# Patient Record
Sex: FEMALE | Race: WHITE | NOT HISPANIC OR LATINO | URBAN - METROPOLITAN AREA
[De-identification: names, ages, dates, MRNs, and addresses within clinical notes are randomized per-mention and may not be internally consistent; named-entity substitution may affect disease eponyms.]

---

## 2017-01-16 ENCOUNTER — EMERGENCY (EMERGENCY)
Facility: HOSPITAL | Age: 72
LOS: 1 days | Discharge: HOME | End: 2017-01-16
Attending: EMERGENCY MEDICINE | Admitting: EMERGENCY MEDICINE
Payer: MEDICARE

## 2017-01-16 VITALS
TEMPERATURE: 98 F | OXYGEN SATURATION: 100 % | DIASTOLIC BLOOD PRESSURE: 81 MMHG | RESPIRATION RATE: 17 BRPM | SYSTOLIC BLOOD PRESSURE: 104 MMHG | HEART RATE: 122 BPM

## 2017-01-16 VITALS — HEART RATE: 98 BPM

## 2017-01-16 DIAGNOSIS — R10.84 GENERALIZED ABDOMINAL PAIN: ICD-10-CM

## 2017-01-16 DIAGNOSIS — K51.00 ULCERATIVE (CHRONIC) PANCOLITIS WITHOUT COMPLICATIONS: ICD-10-CM

## 2017-01-16 DIAGNOSIS — Z90.710 ACQUIRED ABSENCE OF BOTH CERVIX AND UTERUS: Chronic | ICD-10-CM

## 2017-01-16 LAB
ALBUMIN SERPL ELPH-MCNC: 2.9 G/DL — LOW (ref 3.4–5)
ALP SERPL-CCNC: 77 U/L — SIGNIFICANT CHANGE UP (ref 40–120)
ALT FLD-CCNC: 20 U/L — SIGNIFICANT CHANGE UP (ref 12–42)
ANION GAP SERPL CALC-SCNC: 16 MMOL/L — SIGNIFICANT CHANGE UP (ref 9–16)
AST SERPL-CCNC: 15 U/L — SIGNIFICANT CHANGE UP (ref 15–37)
BILIRUB SERPL-MCNC: 0.7 MG/DL — SIGNIFICANT CHANGE UP (ref 0.2–1.2)
BUN SERPL-MCNC: 17 MG/DL — SIGNIFICANT CHANGE UP (ref 7–23)
CALCIUM SERPL-MCNC: 9.3 MG/DL — SIGNIFICANT CHANGE UP (ref 8.5–10.5)
CHLORIDE SERPL-SCNC: 103 MMOL/L — SIGNIFICANT CHANGE UP (ref 96–108)
CO2 SERPL-SCNC: 19 MMOL/L — LOW (ref 22–31)
CREAT SERPL-MCNC: 1.2 MG/DL — SIGNIFICANT CHANGE UP (ref 0.5–1.3)
EOSINOPHIL NFR BLD AUTO: 2 % — SIGNIFICANT CHANGE UP (ref 0–6)
GLUCOSE SERPL-MCNC: 163 MG/DL — HIGH (ref 70–99)
HCT VFR BLD CALC: 41.1 % — SIGNIFICANT CHANGE UP (ref 34.5–45)
HGB BLD-MCNC: 13.5 G/DL — SIGNIFICANT CHANGE UP (ref 11.5–15.5)
LACTATE SERPL-SCNC: 1.7 MMOL/L — SIGNIFICANT CHANGE UP (ref 0.4–2)
LACTATE SERPL-SCNC: 4.2 MMOL/L — CRITICAL HIGH (ref 0.4–2)
LIDOCAIN IGE QN: 79 U/L — SIGNIFICANT CHANGE UP (ref 73–393)
LYMPHOCYTES # BLD AUTO: 15 % — SIGNIFICANT CHANGE UP (ref 13–44)
MAGNESIUM SERPL-MCNC: 1.8 MG/DL — SIGNIFICANT CHANGE UP (ref 1.6–2.4)
MCHC RBC-ENTMCNC: 30.5 PG — SIGNIFICANT CHANGE UP (ref 27–34)
MCHC RBC-ENTMCNC: 32.8 G/DL — SIGNIFICANT CHANGE UP (ref 32–36)
MCV RBC AUTO: 93 FL — SIGNIFICANT CHANGE UP (ref 80–100)
MONOCYTES NFR BLD AUTO: 9 % — SIGNIFICANT CHANGE UP (ref 2–14)
NEUTROPHILS NFR BLD AUTO: 63 % — SIGNIFICANT CHANGE UP (ref 43–77)
PLATELET # BLD AUTO: 294 K/UL — SIGNIFICANT CHANGE UP (ref 150–400)
POTASSIUM SERPL-MCNC: 3 MMOL/L — LOW (ref 3.5–5.3)
POTASSIUM SERPL-SCNC: 3 MMOL/L — LOW (ref 3.5–5.3)
PROT SERPL-MCNC: 6.9 G/DL — SIGNIFICANT CHANGE UP (ref 6.4–8.2)
RBC # BLD: 4.42 M/UL — SIGNIFICANT CHANGE UP (ref 3.8–5.2)
RBC # FLD: 13.3 % — SIGNIFICANT CHANGE UP (ref 10.3–16.9)
SODIUM SERPL-SCNC: 138 MMOL/L — SIGNIFICANT CHANGE UP (ref 132–145)
WBC # BLD: 9 K/UL — SIGNIFICANT CHANGE UP (ref 3.8–10.5)
WBC # FLD AUTO: 9 K/UL — SIGNIFICANT CHANGE UP (ref 3.8–10.5)

## 2017-01-16 PROCEDURE — 74176 CT ABD & PELVIS W/O CONTRAST: CPT | Mod: 26

## 2017-01-16 PROCEDURE — 99284 EMERGENCY DEPT VISIT MOD MDM: CPT | Mod: 25

## 2017-01-16 PROCEDURE — 93010 ELECTROCARDIOGRAM REPORT: CPT

## 2017-01-16 RX ORDER — CIPROFLOXACIN LACTATE 400MG/40ML
500 VIAL (ML) INTRAVENOUS ONCE
Qty: 0 | Refills: 0 | Status: COMPLETED | OUTPATIENT
Start: 2017-01-16 | End: 2017-01-16

## 2017-01-16 RX ORDER — METRONIDAZOLE 500 MG
1 TABLET ORAL
Qty: 30 | Refills: 0 | OUTPATIENT
Start: 2017-01-16 | End: 2017-01-26

## 2017-01-16 RX ORDER — SODIUM CHLORIDE 9 MG/ML
1000 INJECTION INTRAMUSCULAR; INTRAVENOUS; SUBCUTANEOUS ONCE
Qty: 0 | Refills: 0 | Status: COMPLETED | OUTPATIENT
Start: 2017-01-16 | End: 2017-01-16

## 2017-01-16 RX ORDER — IOHEXOL 300 MG/ML
50 INJECTION, SOLUTION INTRAVENOUS ONCE
Qty: 0 | Refills: 0 | Status: COMPLETED | OUTPATIENT
Start: 2017-01-16 | End: 2017-01-16

## 2017-01-16 RX ORDER — POTASSIUM CHLORIDE 20 MEQ
40 PACKET (EA) ORAL ONCE
Qty: 0 | Refills: 0 | Status: COMPLETED | OUTPATIENT
Start: 2017-01-16 | End: 2017-01-16

## 2017-01-16 RX ORDER — MOXIFLOXACIN HYDROCHLORIDE TABLETS, 400 MG 400 MG/1
1 TABLET, FILM COATED ORAL
Qty: 20 | Refills: 0 | OUTPATIENT
Start: 2017-01-16 | End: 2017-01-26

## 2017-01-16 RX ORDER — METRONIDAZOLE 500 MG
500 TABLET ORAL ONCE
Qty: 0 | Refills: 0 | Status: COMPLETED | OUTPATIENT
Start: 2017-01-16 | End: 2017-01-16

## 2017-01-16 RX ORDER — ONDANSETRON 8 MG/1
4 TABLET, FILM COATED ORAL ONCE
Qty: 0 | Refills: 0 | Status: COMPLETED | OUTPATIENT
Start: 2017-01-16 | End: 2017-01-16

## 2017-01-16 RX ADMIN — Medication 500 MILLIGRAM(S): at 21:26

## 2017-01-16 RX ADMIN — SODIUM CHLORIDE 500 MILLILITER(S): 9 INJECTION INTRAMUSCULAR; INTRAVENOUS; SUBCUTANEOUS at 17:35

## 2017-01-16 RX ADMIN — SODIUM CHLORIDE 500 MILLILITER(S): 9 INJECTION INTRAMUSCULAR; INTRAVENOUS; SUBCUTANEOUS at 16:28

## 2017-01-16 RX ADMIN — Medication 40 MILLIEQUIVALENT(S): at 18:10

## 2017-01-16 RX ADMIN — IOHEXOL 50 MILLILITER(S): 300 INJECTION, SOLUTION INTRAVENOUS at 16:28

## 2017-01-16 RX ADMIN — ONDANSETRON 4 MILLIGRAM(S): 8 TABLET, FILM COATED ORAL at 16:28

## 2017-01-16 NOTE — ED PROVIDER NOTE - NS ED MD SCRIBE ATTENDING SCRIBE SECTIONS
PAST MEDICAL/SURGICAL/SOCIAL HISTORY/HISTORY OF PRESENT ILLNESS/HIV/INTAKE ASSESSMENT/SCREENINGS/VITAL SIGNS( Pullset)/REVIEW OF SYSTEMS/PHYSICAL EXAM

## 2017-01-16 NOTE — ED PROVIDER NOTE - MEDICAL DECISION MAKING DETAILS
mild abd cramping, nvd x 2 wk, will check labs, fluids, antiemetic, CT, reassess, PO challenge, low suspicion for ACS, initial ekg ordered for tachycardia

## 2017-01-16 NOTE — ED PROVIDER NOTE - PHYSICAL EXAMINATION
CON: ao x 3, HENMT: clear oropharynx, dry mucous membrane, soft neck, HEAD: atraumatic, CV: rrr, equal pulses b/l, RESP: cta b/l, GI: +BS, soft, nontender, no rebound, no guarding, SKIN: no rash, MSK: moving all extremities spontaneously, NEURO: nonfocal

## 2017-01-16 NOTE — ED PROVIDER NOTE - PROGRESS NOTE DETAILS
ct reviewed, pancolitis, pt reassessed, soft abd, nontender, offered admission, declines.  reports no sx besides diarrhea, feels much improved after fluids, wishes to go home and will follow up.

## 2017-01-16 NOTE — ED PROVIDER NOTE - OBJECTIVE STATEMENT
71y F Pt s/p hysterectomy presents to ED with worsening nausea and diarrhea x1 week. Associated loss of appetite, dehydration, and mild cramping abd pain. Denies fever, vomiting, CP, and SOB. No recent travel. No recent hospitalization. Hip replacement 4 years ago 71y F Pt s/p hysterectomy presents to ED with worsening nausea and diarrhea x1 week. Associated loss of appetite, dehydration, and mild cramping abd pain. Denies fever, vomiting, CP, and SOB. No recent travel. No recent hospitalization. Hip replacement 4 years ago.  no recent abx.

## 2017-01-19 ENCOUNTER — EMERGENCY (EMERGENCY)
Facility: HOSPITAL | Age: 72
LOS: 1 days | Discharge: HOME | End: 2017-01-19
Attending: EMERGENCY MEDICINE | Admitting: EMERGENCY MEDICINE
Payer: MEDICARE

## 2017-01-19 VITALS
RESPIRATION RATE: 18 BRPM | SYSTOLIC BLOOD PRESSURE: 141 MMHG | TEMPERATURE: 97 F | OXYGEN SATURATION: 98 % | DIASTOLIC BLOOD PRESSURE: 85 MMHG | HEART RATE: 95 BPM

## 2017-01-19 DIAGNOSIS — Z79.899 OTHER LONG TERM (CURRENT) DRUG THERAPY: ICD-10-CM

## 2017-01-19 DIAGNOSIS — Z79.2 LONG TERM (CURRENT) USE OF ANTIBIOTICS: ICD-10-CM

## 2017-01-19 DIAGNOSIS — R11.2 NAUSEA WITH VOMITING, UNSPECIFIED: ICD-10-CM

## 2017-01-19 DIAGNOSIS — E03.9 HYPOTHYROIDISM, UNSPECIFIED: ICD-10-CM

## 2017-01-19 DIAGNOSIS — R19.7 DIARRHEA, UNSPECIFIED: ICD-10-CM

## 2017-01-19 DIAGNOSIS — Z90.710 ACQUIRED ABSENCE OF BOTH CERVIX AND UTERUS: Chronic | ICD-10-CM

## 2017-01-19 DIAGNOSIS — I10 ESSENTIAL (PRIMARY) HYPERTENSION: ICD-10-CM

## 2017-01-19 DIAGNOSIS — Z98.890 OTHER SPECIFIED POSTPROCEDURAL STATES: ICD-10-CM

## 2017-01-19 LAB
ALBUMIN SERPL ELPH-MCNC: 3.1 G/DL — LOW (ref 3.4–5)
ALP SERPL-CCNC: 68 U/L — SIGNIFICANT CHANGE UP (ref 40–120)
ALT FLD-CCNC: 22 U/L — SIGNIFICANT CHANGE UP (ref 12–42)
ANION GAP SERPL CALC-SCNC: 11 MMOL/L — SIGNIFICANT CHANGE UP (ref 9–16)
APPEARANCE UR: CLEAR — SIGNIFICANT CHANGE UP
AST SERPL-CCNC: 19 U/L — SIGNIFICANT CHANGE UP (ref 15–37)
BASOPHILS NFR BLD AUTO: 0.6 % — SIGNIFICANT CHANGE UP (ref 0–2)
BILIRUB SERPL-MCNC: 0.6 MG/DL — SIGNIFICANT CHANGE UP (ref 0.2–1.2)
BILIRUB UR-MCNC: NEGATIVE — SIGNIFICANT CHANGE UP
BUN SERPL-MCNC: 9 MG/DL — SIGNIFICANT CHANGE UP (ref 7–23)
CALCIUM SERPL-MCNC: 9.1 MG/DL — SIGNIFICANT CHANGE UP (ref 8.5–10.5)
CHLORIDE SERPL-SCNC: 109 MMOL/L — HIGH (ref 96–108)
CO2 SERPL-SCNC: 24 MMOL/L — SIGNIFICANT CHANGE UP (ref 22–31)
COLOR SPEC: YELLOW — SIGNIFICANT CHANGE UP
CREAT SERPL-MCNC: 1.01 MG/DL — SIGNIFICANT CHANGE UP (ref 0.5–1.3)
DIFF PNL FLD: (no result)
EOSINOPHIL NFR BLD AUTO: 0.7 % — SIGNIFICANT CHANGE UP (ref 0–6)
GLUCOSE SERPL-MCNC: 154 MG/DL — HIGH (ref 70–99)
GLUCOSE UR QL: NEGATIVE — SIGNIFICANT CHANGE UP
HCT VFR BLD CALC: 38.6 % — SIGNIFICANT CHANGE UP (ref 34.5–45)
HGB BLD-MCNC: 13 G/DL — SIGNIFICANT CHANGE UP (ref 11.5–15.5)
IMM GRANULOCYTES NFR BLD AUTO: 4.1 % — HIGH (ref 0–1.5)
KETONES UR-MCNC: 15 MG/DL
LEUKOCYTE ESTERASE UR-ACNC: (no result)
LIDOCAIN IGE QN: 94 U/L — SIGNIFICANT CHANGE UP (ref 73–393)
LYMPHOCYTES # BLD AUTO: 14 % — SIGNIFICANT CHANGE UP (ref 13–44)
MCHC RBC-ENTMCNC: 31.6 PG — SIGNIFICANT CHANGE UP (ref 27–34)
MCHC RBC-ENTMCNC: 33.7 G/DL — SIGNIFICANT CHANGE UP (ref 32–36)
MCV RBC AUTO: 93.9 FL — SIGNIFICANT CHANGE UP (ref 80–100)
MONOCYTES NFR BLD AUTO: 6.7 % — SIGNIFICANT CHANGE UP (ref 2–14)
NEUTROPHILS NFR BLD AUTO: 73.9 % — SIGNIFICANT CHANGE UP (ref 43–77)
NITRITE UR-MCNC: NEGATIVE — SIGNIFICANT CHANGE UP
PH UR: 6 — SIGNIFICANT CHANGE UP (ref 4–8.1)
PLATELET # BLD AUTO: 409 K/UL — HIGH (ref 150–400)
POTASSIUM SERPL-MCNC: 3.3 MMOL/L — LOW (ref 3.5–5.3)
POTASSIUM SERPL-SCNC: 3.3 MMOL/L — LOW (ref 3.5–5.3)
PROT SERPL-MCNC: 6.7 G/DL — SIGNIFICANT CHANGE UP (ref 6.4–8.2)
PROT UR-MCNC: NEGATIVE MG/DL — SIGNIFICANT CHANGE UP
RBC # BLD: 4.11 M/UL — SIGNIFICANT CHANGE UP (ref 3.8–5.2)
RBC # FLD: 13.4 % — SIGNIFICANT CHANGE UP (ref 10.3–16.9)
SODIUM SERPL-SCNC: 144 MMOL/L — SIGNIFICANT CHANGE UP (ref 132–145)
SP GR SPEC: 1.01 — SIGNIFICANT CHANGE UP (ref 1–1.03)
UROBILINOGEN FLD QL: 0.2 E.U./DL — SIGNIFICANT CHANGE UP
WBC # BLD: 10.7 K/UL — HIGH (ref 3.8–10.5)
WBC # FLD AUTO: 10.7 K/UL — HIGH (ref 3.8–10.5)

## 2017-01-19 PROCEDURE — 99284 EMERGENCY DEPT VISIT MOD MDM: CPT

## 2017-01-19 RX ORDER — SODIUM CHLORIDE 9 MG/ML
1000 INJECTION INTRAMUSCULAR; INTRAVENOUS; SUBCUTANEOUS ONCE
Qty: 0 | Refills: 0 | Status: COMPLETED | OUTPATIENT
Start: 2017-01-19 | End: 2017-01-19

## 2017-01-19 RX ORDER — PANTOPRAZOLE SODIUM 20 MG/1
40 TABLET, DELAYED RELEASE ORAL ONCE
Qty: 0 | Refills: 0 | Status: COMPLETED | OUTPATIENT
Start: 2017-01-19 | End: 2017-01-19

## 2017-01-19 RX ORDER — ONDANSETRON 8 MG/1
1 TABLET, FILM COATED ORAL
Qty: 18 | Refills: 0 | OUTPATIENT
Start: 2017-01-19 | End: 2017-01-25

## 2017-01-19 RX ORDER — SODIUM CHLORIDE 9 MG/ML
3 INJECTION INTRAMUSCULAR; INTRAVENOUS; SUBCUTANEOUS ONCE
Qty: 0 | Refills: 0 | Status: COMPLETED | OUTPATIENT
Start: 2017-01-19 | End: 2017-01-19

## 2017-01-19 RX ORDER — POTASSIUM CHLORIDE 20 MEQ
40 PACKET (EA) ORAL ONCE
Qty: 0 | Refills: 0 | Status: COMPLETED | OUTPATIENT
Start: 2017-01-19 | End: 2017-01-19

## 2017-01-19 RX ORDER — ONDANSETRON 8 MG/1
4 TABLET, FILM COATED ORAL ONCE
Qty: 0 | Refills: 0 | Status: COMPLETED | OUTPATIENT
Start: 2017-01-19 | End: 2017-01-19

## 2017-01-19 RX ADMIN — ONDANSETRON 4 MILLIGRAM(S): 8 TABLET, FILM COATED ORAL at 14:47

## 2017-01-19 RX ADMIN — SODIUM CHLORIDE 1000 MILLILITER(S): 9 INJECTION INTRAMUSCULAR; INTRAVENOUS; SUBCUTANEOUS at 14:48

## 2017-01-19 RX ADMIN — Medication 40 MILLIEQUIVALENT(S): at 16:01

## 2017-01-19 RX ADMIN — SODIUM CHLORIDE 3 MILLILITER(S): 9 INJECTION INTRAMUSCULAR; INTRAVENOUS; SUBCUTANEOUS at 14:48

## 2017-01-19 RX ADMIN — PANTOPRAZOLE SODIUM 40 MILLIGRAM(S): 20 TABLET, DELAYED RELEASE ORAL at 14:47

## 2017-01-19 NOTE — ED PROVIDER NOTE - OBJECTIVE STATEMENT
72 y/o F patient with history of HTN, hypothyroid and acid reflux seen here two days ago and diagnosed with colitis. CT done at that time and patient discharged home. states medication is making her nauseous with vomiting and diarrhea since yesterday. no blood present. states she had a normal dinner yesterday. Denies any pain.

## 2017-01-19 NOTE — ED PROVIDER NOTE - ATTENDING CONTRIBUTION TO CARE
Patient seen and examined by me.  Agree with H&P as documented by PA.  Pt diagnosed with colitis 2 days ago by CT here.  On Cipro/Flagyl.  Improving abdominal cramps and diarrhea, but pt now c/o nausea/vomiting.  REceived IV hydration here.  Labs ok.  Tolerated PO after IV Zofran.  D/C home with Zofran ODT.  continue Cipro/Flagyl.

## 2017-01-19 NOTE — ED PROVIDER NOTE - NS ED MD SCRIBE ATTENDING SCRIBE SECTIONS
REVIEW OF SYSTEMS/DISPOSITION/PHYSICAL EXAM/HIV/HISTORY OF PRESENT ILLNESS/PAST MEDICAL/SURGICAL/SOCIAL HISTORY/INTAKE ASSESSMENT/SCREENINGS

## 2017-01-19 NOTE — ED PROVIDER NOTE - MEDICAL DECISION MAKING DETAILS
The scribe's documentation has been prepared under my direction and personally reviewed by me in its entirety. I confirm that the note above accurately reflects all work, treatment, procedures, and medical decision making performed by me. NITISH Rodriguez The scribe's documentation has been prepared under my direction and personally reviewed by me in its entirety. I confirm that the note above accurately reflects all work, treatment, procedures, and medical decision making performed by me. NITISH Rodriguez    Pt. with with recent dx of mild colitis, on cipro/flagyl pt. states causing her to feel nauseous. vss, abd. soft, tolerating PO, diarrhea improved, k 3.3, replaced,  will give rx for antiemetic

## 2017-01-19 NOTE — ED ADULT TRIAGE NOTE - CHIEF COMPLAINT QUOTE
pt was here on monday and given antibiotics for collitis. pt here c/o terrible nausea chills and vomitting since yesterday.

## 2017-01-20 LAB
CULTURE RESULTS: NO GROWTH — SIGNIFICANT CHANGE UP
SPECIMEN SOURCE: SIGNIFICANT CHANGE UP

## 2018-03-29 ENCOUNTER — INPATIENT (INPATIENT)
Facility: HOSPITAL | Age: 73
LOS: 0 days | Discharge: HOME | DRG: 66 | End: 2018-03-30
Attending: PSYCHIATRY & NEUROLOGY | Admitting: PSYCHIATRY & NEUROLOGY
Payer: COMMERCIAL

## 2018-03-29 ENCOUNTER — EMERGENCY (EMERGENCY)
Facility: HOSPITAL | Age: 73
LOS: 1 days | End: 2018-03-29
Attending: EMERGENCY MEDICINE | Admitting: EMERGENCY MEDICINE
Payer: MEDICARE

## 2018-03-29 VITALS
DIASTOLIC BLOOD PRESSURE: 79 MMHG | RESPIRATION RATE: 17 BRPM | TEMPERATURE: 97 F | OXYGEN SATURATION: 98 % | SYSTOLIC BLOOD PRESSURE: 165 MMHG | HEART RATE: 60 BPM

## 2018-03-29 VITALS
DIASTOLIC BLOOD PRESSURE: 80 MMHG | TEMPERATURE: 98 F | HEART RATE: 82 BPM | SYSTOLIC BLOOD PRESSURE: 149 MMHG | RESPIRATION RATE: 18 BRPM | OXYGEN SATURATION: 100 %

## 2018-03-29 VITALS — TEMPERATURE: 97 F | HEART RATE: 62 BPM | SYSTOLIC BLOOD PRESSURE: 158 MMHG | DIASTOLIC BLOOD PRESSURE: 92 MMHG

## 2018-03-29 DIAGNOSIS — I10 ESSENTIAL (PRIMARY) HYPERTENSION: ICD-10-CM

## 2018-03-29 DIAGNOSIS — E03.9 HYPOTHYROIDISM, UNSPECIFIED: ICD-10-CM

## 2018-03-29 DIAGNOSIS — Z90.710 ACQUIRED ABSENCE OF BOTH CERVIX AND UTERUS: Chronic | ICD-10-CM

## 2018-03-29 DIAGNOSIS — Z29.9 ENCOUNTER FOR PROPHYLACTIC MEASURES, UNSPECIFIED: ICD-10-CM

## 2018-03-29 DIAGNOSIS — I63.9 CEREBRAL INFARCTION, UNSPECIFIED: ICD-10-CM

## 2018-03-29 DIAGNOSIS — R63.8 OTHER SYMPTOMS AND SIGNS CONCERNING FOOD AND FLUID INTAKE: ICD-10-CM

## 2018-03-29 PROBLEM — C55 MALIGNANT NEOPLASM OF UTERUS, PART UNSPECIFIED: Chronic | Status: ACTIVE | Noted: 2017-01-19

## 2018-03-29 LAB
ALBUMIN SERPL ELPH-MCNC: 3.8 G/DL — SIGNIFICANT CHANGE UP (ref 3.4–5)
ALP SERPL-CCNC: 98 U/L — SIGNIFICANT CHANGE UP (ref 40–120)
ALT FLD-CCNC: 19 U/L — SIGNIFICANT CHANGE UP (ref 12–42)
ANION GAP SERPL CALC-SCNC: 12 MMOL/L — SIGNIFICANT CHANGE UP (ref 9–16)
APPEARANCE UR: CLEAR — SIGNIFICANT CHANGE UP
AST SERPL-CCNC: 20 U/L — SIGNIFICANT CHANGE UP (ref 15–37)
BILIRUB SERPL-MCNC: 0.6 MG/DL — SIGNIFICANT CHANGE UP (ref 0.2–1.2)
BILIRUB UR-MCNC: NEGATIVE — SIGNIFICANT CHANGE UP
BUN SERPL-MCNC: 17 MG/DL — SIGNIFICANT CHANGE UP (ref 7–23)
CALCIUM SERPL-MCNC: 9.5 MG/DL — SIGNIFICANT CHANGE UP (ref 8.5–10.5)
CHLORIDE SERPL-SCNC: 105 MMOL/L — SIGNIFICANT CHANGE UP (ref 96–108)
CK MB BLD-MCNC: <2.38 % — SIGNIFICANT CHANGE UP
CK MB CFR SERPL CALC: <0.5 NG/ML — LOW (ref 0.5–3.6)
CK SERPL-CCNC: 21 U/L — LOW (ref 26–192)
CO2 SERPL-SCNC: 25 MMOL/L — SIGNIFICANT CHANGE UP (ref 22–31)
COLOR SPEC: YELLOW — SIGNIFICANT CHANGE UP
CREAT SERPL-MCNC: 0.88 MG/DL — SIGNIFICANT CHANGE UP (ref 0.5–1.3)
DIFF PNL FLD: NEGATIVE — SIGNIFICANT CHANGE UP
GLUCOSE SERPL-MCNC: 122 MG/DL — HIGH (ref 70–99)
GLUCOSE UR QL: NEGATIVE — SIGNIFICANT CHANGE UP
HCT VFR BLD CALC: 43.8 % — SIGNIFICANT CHANGE UP (ref 34.5–45)
HGB BLD-MCNC: 14.4 G/DL — SIGNIFICANT CHANGE UP (ref 11.5–15.5)
KETONES UR-MCNC: NEGATIVE — SIGNIFICANT CHANGE UP
LEUKOCYTE ESTERASE UR-ACNC: (no result)
MAGNESIUM SERPL-MCNC: 2.1 MG/DL — SIGNIFICANT CHANGE UP (ref 1.6–2.6)
MCHC RBC-ENTMCNC: 31.5 PG — SIGNIFICANT CHANGE UP (ref 27–34)
MCHC RBC-ENTMCNC: 32.9 G/DL — SIGNIFICANT CHANGE UP (ref 32–36)
MCV RBC AUTO: 95.8 FL — SIGNIFICANT CHANGE UP (ref 80–100)
NITRITE UR-MCNC: NEGATIVE — SIGNIFICANT CHANGE UP
PH UR: 6.5 — SIGNIFICANT CHANGE UP (ref 5–8)
PLATELET # BLD AUTO: 279 K/UL — SIGNIFICANT CHANGE UP (ref 150–400)
POTASSIUM SERPL-MCNC: 3.5 MMOL/L — SIGNIFICANT CHANGE UP (ref 3.5–5.3)
POTASSIUM SERPL-SCNC: 3.5 MMOL/L — SIGNIFICANT CHANGE UP (ref 3.5–5.3)
PROT SERPL-MCNC: 7.5 G/DL — SIGNIFICANT CHANGE UP (ref 6.4–8.2)
PROT UR-MCNC: NEGATIVE MG/DL — SIGNIFICANT CHANGE UP
RBC # BLD: 4.57 M/UL — SIGNIFICANT CHANGE UP (ref 3.8–5.2)
RBC # FLD: 13.2 % — SIGNIFICANT CHANGE UP (ref 10.3–16.9)
SODIUM SERPL-SCNC: 142 MMOL/L — SIGNIFICANT CHANGE UP (ref 132–145)
SP GR SPEC: 1.01 — SIGNIFICANT CHANGE UP (ref 1–1.03)
TROPONIN I SERPL-MCNC: <0.017 NG/ML — LOW (ref 0.02–0.06)
UROBILINOGEN FLD QL: 0.2 E.U./DL — SIGNIFICANT CHANGE UP
WBC # BLD: 6.8 K/UL — SIGNIFICANT CHANGE UP (ref 3.8–10.5)
WBC # FLD AUTO: 6.8 K/UL — SIGNIFICANT CHANGE UP (ref 3.8–10.5)

## 2018-03-29 PROCEDURE — G0426: CPT

## 2018-03-29 PROCEDURE — 99291 CRITICAL CARE FIRST HOUR: CPT | Mod: 25

## 2018-03-29 PROCEDURE — 71045 X-RAY EXAM CHEST 1 VIEW: CPT | Mod: 26

## 2018-03-29 PROCEDURE — 93010 ELECTROCARDIOGRAM REPORT: CPT

## 2018-03-29 PROCEDURE — 70450 CT HEAD/BRAIN W/O DYE: CPT | Mod: 26

## 2018-03-29 PROCEDURE — 70551 MRI BRAIN STEM W/O DYE: CPT | Mod: 26

## 2018-03-29 RX ORDER — HEPARIN SODIUM 5000 [USP'U]/ML
5000 INJECTION INTRAVENOUS; SUBCUTANEOUS EVERY 8 HOURS
Qty: 0 | Refills: 0 | Status: DISCONTINUED | OUTPATIENT
Start: 2018-03-29 | End: 2018-04-02

## 2018-03-29 RX ORDER — LEVOTHYROXINE SODIUM 125 MCG
1 TABLET ORAL
Qty: 0 | Refills: 0 | COMMUNITY

## 2018-03-29 RX ORDER — ASPIRIN/CALCIUM CARB/MAGNESIUM 324 MG
325 TABLET ORAL ONCE
Qty: 0 | Refills: 0 | Status: COMPLETED | OUTPATIENT
Start: 2018-03-29 | End: 2018-03-29

## 2018-03-29 RX ORDER — HEPARIN SODIUM 5000 [USP'U]/ML
5000 INJECTION INTRAVENOUS; SUBCUTANEOUS EVERY 8 HOURS
Qty: 0 | Refills: 0 | Status: DISCONTINUED | OUTPATIENT
Start: 2018-03-29 | End: 2018-03-30

## 2018-03-29 RX ORDER — LEVOTHYROXINE SODIUM 125 MCG
50 TABLET ORAL DAILY
Qty: 0 | Refills: 0 | Status: DISCONTINUED | OUTPATIENT
Start: 2018-03-29 | End: 2018-03-30

## 2018-03-29 RX ORDER — LISINOPRIL 2.5 MG/1
1 TABLET ORAL
Qty: 0 | Refills: 0 | COMMUNITY

## 2018-03-29 RX ORDER — ASPIRIN/CALCIUM CARB/MAGNESIUM 324 MG
81 TABLET ORAL DAILY
Qty: 0 | Refills: 0 | Status: DISCONTINUED | OUTPATIENT
Start: 2018-03-30 | End: 2018-03-30

## 2018-03-29 RX ORDER — ATORVASTATIN CALCIUM 80 MG/1
80 TABLET, FILM COATED ORAL AT BEDTIME
Qty: 0 | Refills: 0 | Status: DISCONTINUED | OUTPATIENT
Start: 2018-03-29 | End: 2018-03-30

## 2018-03-29 RX ADMIN — Medication 325 MILLIGRAM(S): at 16:45

## 2018-03-29 RX ADMIN — Medication 0.5 MILLIGRAM(S): at 22:12

## 2018-03-29 RX ADMIN — HEPARIN SODIUM 5000 UNIT(S): 5000 INJECTION INTRAVENOUS; SUBCUTANEOUS at 21:25

## 2018-03-29 RX ADMIN — ATORVASTATIN CALCIUM 80 MILLIGRAM(S): 80 TABLET, FILM COATED ORAL at 21:25

## 2018-03-29 NOTE — H&P ADULT - ATTENDING COMMENTS
Patient seen and examined.  Agree with above.  Patient continues to have left arm numbness but less than yesterday.  No specific weakness in her arms or legs.  No headache or neck pain.  sbp 130-160's    O: General - sitting in bed, NAD  CV: RRR  Extremities: 2+ radial pulses bilaterally    Neurological exam:   Mental status - AA&Ox3, fluent, no dysarthria, follows all commands, able provide full history, memory intact  Cranial nerves - EOMI, PERRL+, VFF, no nystagmus, facial sensation mainly intact, no facial droop, palatal elevation intact, tongue midline  Motor - No pronator drift, 5/5 x4, normal bulk and tone  Sensory - Decreased light touch to 95% on left side of arm, intact in lower extremities  Cerebellar - FNF intact bilaterally  Gait - deferred    MR Head No Cont (03.29.18 @ 23:02) -     1. Findings consistent with an acute right thalamic infarct.  2. Chronic white matter microangiopathic ischemic disease.   3. Chronic lacunar infarct involving the right subcortical insular    Echocardiogram w/ Bubble and Doppler (03.30.18 @ 11:52) -     Normal left ventricular size and wall thickness. The left ventricular wall motion is normal. The left ventricular ejection fraction is estimated to   be 60-65%. The left atrial size is normal. No evidence for interatrial shunt by color Doppler assessment.  Injection of agitated saline contrast   documented no interatrial shunt.  Right atrial size is normal. The right ventricle is normal in size and function. No aortic regurgitation noted. No hemodynamically significant valvular aortic stenosis. There is trace mitral regurgitation. There is trace tricuspid regurgitation.    Lipid Profile (03.30.18 @ 06:14)    Total Cholesterol/HDL Ratio Measurement: 3.6 RATIO    Cholesterol, Serum: 194 mg/dL    Triglycerides, Serum: 91 mg/dL    HDL Cholesterol, Serum: 54 mg/dL    Direct LDL: 122  Hemoglobin A1C, Whole Blood (03.30.18 @ 06:14)    Hemoglobin A1C, Whole Blood: 4.8    Assessment: 72 year-old female presents with left sided numbness noted upon wakening yesterday.  Found to have right thalamic acute infarct that correlates well with her numbness.    - She was not a tPA candidate due to outside window and resolving symptoms.    Plan:  1) Secondary stroke prevention -   a) Continue Aspirin 81mg for antiplatelet  b) Continue Atorvastatin 80mg for statin.    2) Stroke risk factors - most important intervention given infarct's size and location -   a) HTN - slightly high in setting of acute infarct.  May need low level medication for control.  She should monitor as outpatient and discuss with her PMD.  Goal sbp - less than 140/80  b) Hyperlipidemia - LDL goal is less than 100    3) Stroke workup -   a) Hypercoagulable workup since young person with infarct and doesn't have major stroke risk factors.  b) Physical therapy evaluation    4) DVT prophylaxis - Heparin SQ TID    Patient neurologically cleared for discharge on above regimen once evaluated by physical therapy.    Discussed stroke signs and symptoms.  Addressed her questions.

## 2018-03-29 NOTE — ED ADULT NURSE NOTE - OBJECTIVE STATEMENT
Patient to ED with complaint of left sided numbness of face arm and hand.  Felt since this morning.  Patient demonstrating no slurred speech or neuro deficits.

## 2018-03-29 NOTE — ED ADULT TRIAGE NOTE - CHIEF COMPLAINT QUOTE
left sided numbness hands, legs and facial area,, started 2pm today. left sided numbness hands, legs and facial area,, started 2pm today. STROKE CODE CALLED.

## 2018-03-29 NOTE — H&P ADULT - NSHPSOCIALHISTORY_GEN_ALL_CORE
Former smoker but quit 50 years ago. Denies EtOH. Denies other drug use. Lives alone in elevator [employer].

## 2018-03-29 NOTE — H&P ADULT - NSHPLABSRESULTS_GEN_ALL_CORE
LABS                 14.4   6.8   )-----------( 279      ( 29 Mar 2018 15:31 )             43.8         142  |  105  |  17  ----------------------------<  122<H>  3.5   |  25  |  0.88    Ca    9.5      29 Mar 2018 15:31  Mg     2.1         TPro  7.5  /  Alb  3.8  /  TBili  0.6  /  DBili  x   /  AST  20  /  ALT  19  /  AlkPhos  98        Urinalysis Basic - ( 29 Mar 2018 16:35 )    Color: Yellow / Appearance: Clear / S.015 / pH: x  Gluc: x / Ketone: NEGATIVE  / Bili: NEGATIVE / Urobili: 0.2 E.U./dL   Blood: x / Protein: NEGATIVE mg/dL / Nitrite: NEGATIVE   Leuk Esterase: Trace / RBC: < 5 /HPF / WBC < 5 /HPF   Sq Epi: x / Non Sq Epi: Few /HPF / Bacteria: Present /HPF      CARDIAC MARKERS ( 29 Mar 2018 15:31 )  <0.017 ng/mL / x     / 21 U/L / x     / <0.5 ng/mL

## 2018-03-29 NOTE — H&P ADULT - PROBLEM SELECTOR PLAN 4
F: No IVF  E: Replete PRN  N: NPO pending dysphagia screen, suspect patient will pass, following DASH/TLC

## 2018-03-29 NOTE — ED PROVIDER NOTE - PROGRESS NOTE DETAILS
walterke with Dr. Resendiz. Pt with a mild contrast allergy. will forgo CTA imaging at this point and have pt evaluated by stroke neurologist.

## 2018-03-29 NOTE — ED PROVIDER NOTE - NEUROLOGICAL, MLM
Alert and oriented, no focal deficits, no motor deficits.  No pronator drift.  NO truncal ataxia.  normal FS, BRIGITTE.  Slight sensory deficit (80-90% of R side) when testing L face, arm, and leg

## 2018-03-29 NOTE — ED ADULT NURSE REASSESSMENT NOTE - NS ED NURSE REASSESS COMMENT FT1
patient walks with steady gait to the bathroom.   she reports that she woke up today at 2pm and had the symptoms of numbness of left cheek and left hand and foot which have not resolved

## 2018-03-29 NOTE — ED PROVIDER NOTE - OBJECTIVE STATEMENT
73 y/o F with hx of hypothyroidism (on meds) presents with L-sided numbness since she woke up at 2PM today. Pt states she fell asleep around 5-6AM today (pt's sleep schedule is reversed) and had slept intermittently, she had felt slightly numb but thought she was still dreaming. Pt states she was fine the day before. Pt states numbness is primarily in her L hand and slightly to her L arm, leg, and face. Pt states the numbness started in her hand and when she got up, she realized that her face and leg felt slightly numb too. Denies weakness, pt ambulated to the ED. Denies HA or dizziness. Pt states her mouth is slightly crooked at baseline.   Denies h/o DM or hyperlipidemia. Pt used to be on lisinopril for PMHx of HTN but states her BP is now WNL. Denies h/o stroke or TIA. Pt has had CT several times but mentions that she had a slight rash and sneezing the last time she had CT with contrast. Also has issues with Benadryl. 73 y/o F with hx of hypothyroidism (on meds) and hyperlipidemia presents with L-sided numbness since she woke up at 2PM today. Pt states she fell asleep around 5-6AM today (pt's sleep schedule is reversed) and had slept intermittently, she had felt slightly numb but thought she was still dreaming. Pt states she was fine the day before. Pt states numbness is primarily in her L hand and slightly to her L arm, leg, and face. Pt states the numbness started in her hand and when she got up, she realized that her face and leg felt slightly numb too. Denies h/o similar incidences. Denies weakness, pt ambulated to the ED. Denies HA, neck pain, dizziness, or chest pain. Pt states her mouth is slightly crooked at baseline.   Pt used to be on lisinopril for PMHx of HTN but her BP improved so her PMD took her off it. Pt states she has slightly elevated blood glucose levels. Denies h/o stroke or TIA.   Pt has had CT several times but mentions that she had a slight rash and sneezing the last time she had CT with contrast. Also has issues with Benadryl. 71 y/o F with hx of hypothyroidism (on meds) and hyperlipidemia, PSHx of R hip replacement, presents with L-sided numbness since she woke up at 2PM today. Pt states she fell asleep around 5-6AM today (pt's sleep schedule is reversed) and had slept intermittently, she had felt slightly numb but thought she was still dreaming. Pt states she was fine the day before. Pt states numbness is primarily in her L hand and slightly to her L arm, leg, and face. Pt states the numbness started in her hand and when she got up, she realized that her face and leg felt slightly numb too. Denies h/o similar incidences. Denies weakness, pt ambulated to the ED. Denies HA, neck pain, dizziness, or chest pain. Pt states her mouth is slightly crooked at baseline.   Pt used to be on lisinopril for PMHx of HTN but her BP improved so her PMD took her off it. Pt states she has slightly elevated blood glucose levels. Denies h/o stroke or TIA.   Pt has had CT several times but mentions that she had a slight rash and sneezing the last time she had CT with contrast. Also has issues with Benadryl. 71 y/o F with hx of hypothyroidism (on meds) and hyperlipidemia, PSHx of R hip replacement, presents with L-sided numbness since she woke up at 2PM today. Pt states she fell asleep around 5-6AM today (pt's sleep schedule is reversed) and had slept intermittently, she had felt slightly numb but thought she was still dreaming. Pt states she was fine the day before. Pt states numbness is primarily in her L hand and slightly to her L arm, leg, and face. Pt states the numbness started in her hand and when she got up, she realized that her face and leg felt slightly numb too. Denies h/o similar incidences. Denies weakness, pt ambulated to the ED. Denies HA, neck pain, dizziness, or chest pain. Pt states her mouth is slightly crooked at baseline.   Pt used to be on lisinopril for PMHx of HTN but her BP improved so her PMD took her off it. Pt states she has slightly elevated blood glucose levels. Denies h/o stroke or TIA. Denies EtOH use. Denies tobacco use currently (former smoker, pt quit in her 20s).  Pt has had CT several times but mentions that she had a slight rash and sneezing the last time she had CT with contrast. Also has issues with Benadryl. 73 y/o F with hx of hypothyroidism (on meds) and hyperlipidemia, PSHx of R hip replacement, presents with L-sided numbness since she woke up at 2PM today. Pt states she fell asleep around 5-6AM today (pt's sleep schedule is reversed) and had slept intermittently, she had felt slightly numb but thought she was still dreaming. Pt states she was fine the day before. Pt states numbness is primarily in her L hand and slightly to her L arm, leg, and face. Pt states the numbness started in her hand and when she got up, she realized that her face and leg felt slightly numb too. Denies h/o similar incidences. Denies weakness, pt ambulated to the ED. Denies HA, neck pain, dizziness, or chest pain. Pt states her mouth is slightly crooked at baseline.   Denies alcohol or drug use  Pt used to be on lisinopril for PMHx of HTN but her BP improved so her PMD took her off it. Pt states she has slightly elevated blood glucose levels. Denies h/o stroke or TIA. Denies EtOH use. Denies tobacco use currently (former smoker, pt quit in her 20s).  Pt has had CT several times but mentions that she had a slight rash and sneezing the last time she had CT with contrast. Also has issues with Benadryl.

## 2018-03-29 NOTE — ED PROVIDER NOTE - MEDICAL DECISION MAKING DETAILS
Stroke code called and patient evaluated on arrival and brought to CT scan.  Last known normal ~ 5-6 am which puts the patient outside the window for TPA.  Also unable to get CTA at this time due to iodine allergy and reaction to IV contrast during her last scan.  BP elevated on arrival.  Slight sensory deficit on exam.  Neuro exam performed with Dr. Resendiz (please see her stroke note for full details).  EKG with LVH pattern.  Labs unremarkable.  CXR reviewed.  D/w her PMD Dr. Esposito.  Will admit to [facility] for further evaluation.  Symptoms persist and ABCD2 score 4.

## 2018-03-29 NOTE — PATIENT PROFILE ADULT. - NS PRO CONTRA FLU CONTRAIND
pt refused/Allergy sensitivity to eggs, thimerosal (if included in the vaccine), or any other component of the vaccine (i.e., aminoglycosides)

## 2018-03-29 NOTE — H&P ADULT - NSHPREVIEWOFSYSTEMS_GEN_ALL_CORE
REVIEW OF SYSTEMS:  CONSTITUTIONAL: No weakness, fevers or chills  EYES/ENT: No visual changes;  No vertigo or throat pain   NECK: No pain or stiffness  RESPIRATORY: No cough, wheezing, hemoptysis; No shortness of breath  CARDIOVASCULAR: No chest pain or palpitations  GASTROINTESTINAL: No abdominal or epigastric pain. No nausea, vomiting, or hematemesis; No diarrhea or constipation. No melena or hematochezia.  GENITOURINARY: No dysuria, frequency or hematuria  NEUROLOGICAL: +numbness or No weakness  SKIN: No itching, burning, rashes, or lesions   All other review of systems is negative unless indicated above.

## 2018-03-29 NOTE — H&P ADULT - HISTORY OF PRESENT ILLNESS
72F with hypothyroid and hypertension (diet controlled) who presented with left sided numbness of 1 day duration. Patient states when she woke up at 14:00 on day of admission she felt the left hand, face, leg, and foot were numb (not left arm). Nothing she did could make it better. Nothing made it worse. It stayed the same intensity throughout the day. At first she thought it was because she "slept funny" but when it didn't improve she went to [facility].    Patient denies weakness, slurred speech, tingling, blurry vision, changes in hearing, seizures, nausea, vertigo, changes in urinary or bowel habits.     At [facility] T98, /80, HR82, O2Sat 100% on RA. She got progressively more hypertensive while at [facility]. Stroke code was called. NIHSS 2. Given ASA 325mg. CTH  showed R capsular and basal ganglia lacunar infarcts. Prior to transport to [facility] patient states numbness improved.

## 2018-03-29 NOTE — H&P ADULT - NSHPPHYSICALEXAM_GEN_ALL_CORE
VITALS  Vital Signs Last 24 Hrs  T(C): 36.2 (29 Mar 2018 19:10), Max: 36.7 (29 Mar 2018 15:14)  T(F): 97.1 (29 Mar 2018 19:10), Max: 98 (29 Mar 2018 15:14)  HR: 60 (29 Mar 2018 20:40) (59 - 82)  BP: 165/79 (29 Mar 2018 20:40) (149/80 - 170/79)  BP(mean): 114 (29 Mar 2018 20:40) (114 - 114)  RR: 17 (29 Mar 2018 20:40) (16 - 18)  SpO2: 98% (29 Mar 2018 20:40) (98% - 100%)    CAPILLARY BLOOD GLUCOSE  POCT Blood Glucose.: 109 mg/dL (29 Mar 2018 15:18)    PHYSICAL EXAM  General: NAD  Head: NC/AT; PERRL; EOMI; anicteric sclera  Respiratory: CTA B/L; no wheezes/crackles/rales auscultated w/ good air movement  Cardiovascular: Regular rhythm/rate; S1/S2; no gallops or murmurs auscultated  Gastrointestinal: Soft; NTND w/out rebound tenderness or guarding; bowel sounds normal  Extremities: WWP; no edema or cyanosis; radial/pedal pulses palpable  Neurologic:  - Mental Status:  AAOx3; speech is fluent with intact naming, repetition, and comprehension  - Cranial Nerves II-XII:    II:  PERRLA; visual fields are full to confrontation  III, IV, VI:  EOMI, no nystagmus  V:  facial sensation is intact in the V1-V3 distribution bilaterally.  VII:  face is symmetric with normal eye closure and smile  VIII:  hearing is intact to finger rub  IX, X:  uvula is midline and soft palate rises symmetrically  XI:  head turning and shoulder shrug are intact bilaterally  XII:  tongue protrudes in the midline  - Motor:  strength is 5/5 throughout; normal muscle bulk and tone throughout; no pronator drift  - Reflexes:  2+ and symmetric at the biceps, triceps, brachioradialis, knees, and ankles;  plantar reflexes downgoing bilaterally  - Sensory:  intact to pin prick, vibration, and joint-position sense throughout. Decreased sensation to light touch on left compared to right. Patient states much improved from before, lighter on left slightly than right.   - Coordination:  finger-nose-finger and heel-knee-shin intact without dysmetria; rapid alternating hand movements intact  - Gait:  normal steps, base, arm swing, and turning; heel and toe walking are normal; tandem gait is normal; Romberg testing is negative VITALS  Vital Signs Last 24 Hrs  T(C): 36.2 (29 Mar 2018 19:10), Max: 36.7 (29 Mar 2018 15:14)  T(F): 97.1 (29 Mar 2018 19:10), Max: 98 (29 Mar 2018 15:14)  HR: 60 (29 Mar 2018 20:40) (59 - 82)  BP: 165/79 (29 Mar 2018 20:40) (149/80 - 170/79)  BP(mean): 114 (29 Mar 2018 20:40) (114 - 114)  RR: 17 (29 Mar 2018 20:40) (16 - 18)  SpO2: 98% (29 Mar 2018 20:40) (98% - 100%)    CAPILLARY BLOOD GLUCOSE  POCT Blood Glucose.: 109 mg/dL (29 Mar 2018 15:18)    PHYSICAL EXAM  General: NAD  Head: NC/AT; PERRL; EOMI; anicteric sclera  Respiratory: CTA B/L; no wheezes/crackles/rales auscultated w/ good air movement  Cardiovascular: Regular rhythm/rate; S1/S2; no gallops or murmurs auscultated  Gastrointestinal: Soft; NTND w/out rebound tenderness or guarding; bowel sounds normal  Extremities: WWP; no edema or cyanosis; radial/pedal pulses palpable  Neurologic:  - Mental Status:  AAOx3; speech is fluent with intact naming, repetition, and comprehension  - Cranial Nerves II-XII:    II:  PERRLA; visual fields are full to confrontation  III, IV, VI:  EOMI, no nystagmus  V:  L-sided numbness VI-III on   VII:  +mild R nasolabial flattening  VIII:  hearing is intact to finger rub  IX, X:  uvula is midline and soft palate rises symmetrically  XI:  head turning and shoulder shrug are intact bilaterally  XII:  tongue protrudes in the midline  - Motor:  strength is 5/5 throughout; normal muscle bulk and tone throughout; no pronator drift  - Reflexes:  2+ and symmetric at the biceps, triceps, brachioradialis, knees, and ankles;  plantar reflexes downgoing bilaterally  - Sensory:  intact to pin prick, vibration, and joint-position sense throughout. Decreased sensation to light touch on left compared to right. Patient states much improved from before, lighter on left slightly than right.   - Coordination:  finger-nose-finger and heel-knee-shin intact without dysmetria; rapid alternating hand movements intact  - Gait:  normal steps, base, arm swing, and turning; heel and toe walking are normal; tandem gait is normal; Romberg testing is negative

## 2018-03-29 NOTE — H&P ADULT - ASSESSMENT
72F with HTN and hypothyroid who presented with several hours of left sided numbness, found to have CVA on CTH. Improvement in numbness since presentation of symptoms. Being admitted for optimization and work up for stroke to telemetry unit.

## 2018-03-29 NOTE — H&P ADULT - PROBLEM SELECTOR PLAN 1
-MRI Head; patient has claustrophobia will give 0.5mg Ativan   -Start ASA 81mg Qd to start tomorrow  -Start Atorvastatin 80mg QHS  -PT/OT  -Dysphagia/Aspiration Precautions  -F/U Lipid, HgbA1c, TSH -MRI Head; patient has claustrophobia will give 0.5mg Ativan   -Start ASA 81mg Qd to start tomorrow  -Start Atorvastatin 80mg QHS  -ECHO with bubble in AM  -PT/OT  -Dysphagia/Aspiration Precautions  -F/U Lipid, HgbA1c, TSH

## 2018-03-29 NOTE — ED PROVIDER NOTE - PHYSICAL EXAMINATION
clear and coherent speech, normal cranial nerve exam, normal finger to nose and BRIGITTE.  Steady gait.  No pronator drift.

## 2018-03-30 ENCOUNTER — TRANSCRIPTION ENCOUNTER (OUTPATIENT)
Age: 73
End: 2018-03-30

## 2018-03-30 VITALS — TEMPERATURE: 98 F

## 2018-03-30 PROBLEM — Z00.00 ENCOUNTER FOR PREVENTIVE HEALTH EXAMINATION: Status: ACTIVE | Noted: 2018-03-30

## 2018-03-30 LAB
ANION GAP SERPL CALC-SCNC: 11 MMOL/L — SIGNIFICANT CHANGE UP (ref 5–17)
BLD GP AB SCN SERPL QL: NEGATIVE — SIGNIFICANT CHANGE UP
BUN SERPL-MCNC: 13 MG/DL — SIGNIFICANT CHANGE UP (ref 7–23)
CALCIUM SERPL-MCNC: 8.6 MG/DL — SIGNIFICANT CHANGE UP (ref 8.4–10.5)
CHLORIDE SERPL-SCNC: 107 MMOL/L — SIGNIFICANT CHANGE UP (ref 96–108)
CHOLEST SERPL-MCNC: 194 MG/DL — SIGNIFICANT CHANGE UP (ref 10–199)
CO2 SERPL-SCNC: 25 MMOL/L — SIGNIFICANT CHANGE UP (ref 22–31)
CREAT SERPL-MCNC: 0.64 MG/DL — SIGNIFICANT CHANGE UP (ref 0.5–1.3)
GLUCOSE SERPL-MCNC: 100 MG/DL — HIGH (ref 70–99)
HBA1C BLD-MCNC: 4.8 % — SIGNIFICANT CHANGE UP (ref 4–5.6)
HCT VFR BLD CALC: 39 % — SIGNIFICANT CHANGE UP (ref 34.5–45)
HDLC SERPL-MCNC: 54 MG/DL — SIGNIFICANT CHANGE UP (ref 40–125)
HGB BLD-MCNC: 12.6 G/DL — SIGNIFICANT CHANGE UP (ref 11.5–15.5)
LIPID PNL WITH DIRECT LDL SERPL: 122 MG/DL — SIGNIFICANT CHANGE UP
MAGNESIUM SERPL-MCNC: 2.1 MG/DL — SIGNIFICANT CHANGE UP (ref 1.6–2.6)
MCHC RBC-ENTMCNC: 30.8 PG — SIGNIFICANT CHANGE UP (ref 27–34)
MCHC RBC-ENTMCNC: 32.3 G/DL — SIGNIFICANT CHANGE UP (ref 32–36)
MCV RBC AUTO: 95.4 FL — SIGNIFICANT CHANGE UP (ref 80–100)
PLATELET # BLD AUTO: 238 K/UL — SIGNIFICANT CHANGE UP (ref 150–400)
POTASSIUM SERPL-MCNC: 4 MMOL/L — SIGNIFICANT CHANGE UP (ref 3.5–5.3)
POTASSIUM SERPL-SCNC: 4 MMOL/L — SIGNIFICANT CHANGE UP (ref 3.5–5.3)
RBC # BLD: 4.09 M/UL — SIGNIFICANT CHANGE UP (ref 3.8–5.2)
RBC # FLD: 13.6 % — SIGNIFICANT CHANGE UP (ref 10.3–16.9)
RH IG SCN BLD-IMP: POSITIVE — SIGNIFICANT CHANGE UP
SODIUM SERPL-SCNC: 143 MMOL/L — SIGNIFICANT CHANGE UP (ref 135–145)
TOTAL CHOLESTEROL/HDL RATIO MEASUREMENT: 3.6 RATIO — SIGNIFICANT CHANGE UP (ref 3.3–7.1)
TRIGL SERPL-MCNC: 91 MG/DL — SIGNIFICANT CHANGE UP (ref 10–149)
TSH SERPL-MCNC: 1.62 UIU/ML — SIGNIFICANT CHANGE UP (ref 0.35–4.94)
WBC # BLD: 6.2 K/UL — SIGNIFICANT CHANGE UP (ref 3.8–10.5)
WBC # FLD AUTO: 6.2 K/UL — SIGNIFICANT CHANGE UP (ref 3.8–10.5)

## 2018-03-30 PROCEDURE — 70551 MRI BRAIN STEM W/O DYE: CPT

## 2018-03-30 PROCEDURE — 86146 BETA-2 GLYCOPROTEIN ANTIBODY: CPT

## 2018-03-30 PROCEDURE — 70450 CT HEAD/BRAIN W/O DYE: CPT

## 2018-03-30 PROCEDURE — 93005 ELECTROCARDIOGRAM TRACING: CPT

## 2018-03-30 PROCEDURE — 86850 RBC ANTIBODY SCREEN: CPT

## 2018-03-30 PROCEDURE — 71045 X-RAY EXAM CHEST 1 VIEW: CPT

## 2018-03-30 PROCEDURE — 36415 COLL VENOUS BLD VENIPUNCTURE: CPT

## 2018-03-30 PROCEDURE — 83735 ASSAY OF MAGNESIUM: CPT

## 2018-03-30 PROCEDURE — 80061 LIPID PANEL: CPT

## 2018-03-30 PROCEDURE — 85613 RUSSELL VIPER VENOM DILUTED: CPT

## 2018-03-30 PROCEDURE — 85027 COMPLETE CBC AUTOMATED: CPT

## 2018-03-30 PROCEDURE — 99239 HOSP IP/OBS DSCHRG MGMT >30: CPT

## 2018-03-30 PROCEDURE — 80048 BASIC METABOLIC PNL TOTAL CA: CPT

## 2018-03-30 PROCEDURE — 85598 HEXAGNAL PHOSPH PLTLT NEUTRL: CPT

## 2018-03-30 PROCEDURE — 83090 ASSAY OF HOMOCYSTEINE: CPT

## 2018-03-30 PROCEDURE — 84443 ASSAY THYROID STIM HORMONE: CPT

## 2018-03-30 PROCEDURE — 85303 CLOT INHIBIT PROT C ACTIVITY: CPT

## 2018-03-30 PROCEDURE — 86900 BLOOD TYPING SEROLOGIC ABO: CPT

## 2018-03-30 PROCEDURE — 85307 ASSAY ACTIVATED PROTEIN C: CPT

## 2018-03-30 PROCEDURE — 93306 TTE W/DOPPLER COMPLETE: CPT

## 2018-03-30 PROCEDURE — 85301 ANTITHROMBIN III ANTIGEN: CPT

## 2018-03-30 PROCEDURE — 85300 ANTITHROMBIN III ACTIVITY: CPT

## 2018-03-30 PROCEDURE — 97161 PT EVAL LOW COMPLEX 20 MIN: CPT

## 2018-03-30 PROCEDURE — 85306 CLOT INHIBIT PROT S FREE: CPT

## 2018-03-30 PROCEDURE — 86901 BLOOD TYPING SEROLOGIC RH(D): CPT

## 2018-03-30 PROCEDURE — 93306 TTE W/DOPPLER COMPLETE: CPT | Mod: 26

## 2018-03-30 PROCEDURE — 83036 HEMOGLOBIN GLYCOSYLATED A1C: CPT

## 2018-03-30 RX ORDER — SODIUM CHLORIDE 9 MG/ML
500 INJECTION INTRAMUSCULAR; INTRAVENOUS; SUBCUTANEOUS ONCE
Qty: 0 | Refills: 0 | Status: COMPLETED | OUTPATIENT
Start: 2018-03-30 | End: 2018-03-30

## 2018-03-30 RX ORDER — SODIUM CHLORIDE 9 MG/ML
1000 INJECTION INTRAMUSCULAR; INTRAVENOUS; SUBCUTANEOUS
Qty: 0 | Refills: 0 | Status: DISCONTINUED | OUTPATIENT
Start: 2018-03-30 | End: 2018-03-30

## 2018-03-30 RX ORDER — ASPIRIN/CALCIUM CARB/MAGNESIUM 324 MG
1 TABLET ORAL
Qty: 30 | Refills: 2 | OUTPATIENT
Start: 2018-03-30 | End: 2018-06-27

## 2018-03-30 RX ORDER — ATORVASTATIN CALCIUM 80 MG/1
1 TABLET, FILM COATED ORAL
Qty: 30 | Refills: 2 | OUTPATIENT
Start: 2018-03-30 | End: 2018-06-27

## 2018-03-30 RX ADMIN — SODIUM CHLORIDE 80 MILLILITER(S): 9 INJECTION INTRAMUSCULAR; INTRAVENOUS; SUBCUTANEOUS at 06:28

## 2018-03-30 RX ADMIN — HEPARIN SODIUM 5000 UNIT(S): 5000 INJECTION INTRAVENOUS; SUBCUTANEOUS at 15:25

## 2018-03-30 RX ADMIN — HEPARIN SODIUM 5000 UNIT(S): 5000 INJECTION INTRAVENOUS; SUBCUTANEOUS at 06:28

## 2018-03-30 RX ADMIN — Medication 50 MICROGRAM(S): at 06:27

## 2018-03-30 RX ADMIN — Medication 81 MILLIGRAM(S): at 15:40

## 2018-03-30 RX ADMIN — SODIUM CHLORIDE 80 MILLILITER(S): 9 INJECTION INTRAMUSCULAR; INTRAVENOUS; SUBCUTANEOUS at 01:46

## 2018-03-30 RX ADMIN — SODIUM CHLORIDE 30.03 MILLILITER(S): 9 INJECTION INTRAMUSCULAR; INTRAVENOUS; SUBCUTANEOUS at 01:47

## 2018-03-30 NOTE — DISCHARGE NOTE ADULT - PATIENT PORTAL LINK FT
You can access the [facility] Patient Portal, offered by [facility], by registering with the following website: [email or web address]

## 2018-03-30 NOTE — OCCUPATIONAL THERAPY INITIAL EVALUATION ADULT - COORDINATION ASSESSED, REHAB EVAL
finger to nose/Finger to nose within normal limits Finger to nose within normal limits/finger to nose

## 2018-03-30 NOTE — DISCHARGE NOTE ADULT - CARE PLAN
Principal Discharge DX:	Cerebrovascular accident (CVA), unspecified mechanism  Goal:	Prevention of future strokes  Assessment and plan of treatment:	You were found to have a stroke on CT scan and on MRI.  You were started on aspirin and atorvastatin.  You had labs drawn for further workup.  Please follow up with Dr. Resendiz in 4 weeks. On Wednesday April 25th 12:00pm Phone: [phone].  Secondary Diagnosis:	Hypothyroidism  Assessment and plan of treatment:	Please continue with your home medications as prescribed.

## 2018-03-30 NOTE — OCCUPATIONAL THERAPY INITIAL EVALUATION ADULT - LIGHT TOUCH SENSATION, LUE, REHAB EVAL
within normal limits/Pt reports mild numbness in left hand, light touch intact, reports improvement since admission

## 2018-03-30 NOTE — CONSULT NOTE ADULT - SUBJECTIVE AND OBJECTIVE BOX
Patient is a 72y old  Female who presents with a chief complaint of Left Sided Numbness (29 Mar 2018 20:42)       HPI:  72F with hypothyroid and hypertension (diet controlled) who presented with left sided numbness of 1 day duration. Patient states when she woke up at 14:00 on day of admission she felt the left hand, face, leg, and foot were numb (not left arm). Nothing she did could make it better. Nothing made it worse. It stayed the same intensity throughout the day. At first she thought it was because she "slept funny" but when it didn't improve she went to [facility].    Patient denies weakness, slurred speech, tingling, blurry vision, changes in hearing, seizures, nausea, vertigo, changes in urinary or bowel habits.     At [facility] T98, /80, HR82, O2Sat 100% on RA. She got progressively more hypertensive while at [facility]. Stroke code was called. NIHSS 2. Given ASA 325mg. CTH  showed R capsular and basal ganglia lacunar infarcts. Prior to transport to [facility] patient states numbness improved. (29 Mar 2018 20:42)      PAST MEDICAL & SURGICAL HISTORY:  Hypertension  Uterine cancer  Hypothyroidism  S/P hysterectomy      MEDICATIONS  (STANDING):  aspirin enteric coated 81 milliGRAM(s) Oral daily  atorvastatin 80 milliGRAM(s) Oral at bedtime  heparin  Injectable 5000 Unit(s) SubCutaneous every 8 hours  levothyroxine 50 MICROGram(s) Oral daily  sodium chloride 0.9%. 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    Functional Level Prior to Admission: ADL independent, walks without assistive devices    FAMILY HISTORY:  No pertinent family history in first degree relatives      CBC Full  -  ( 30 Mar 2018 06:14 )  WBC Count : 6.2 K/uL  Hemoglobin : 12.6 g/dL  Hematocrit : 39.0 %  Platelet Count - Automated : 238 K/uL  Mean Cell Volume : 95.4 fL  Mean Cell Hemoglobin : 30.8 pg  Mean Cell Hemoglobin Concentration : 32.3 g/dL  Auto Neutrophil # : x  Auto Lymphocyte # : x  Auto Monocyte # : x  Auto Eosinophil # : x  Auto Basophil # : x  Auto Neutrophil % : x  Auto Lymphocyte % : x  Auto Monocyte % : x  Auto Eosinophil % : x  Auto Basophil % : x          143  |  107  |  13  ----------------------------<  100<H>  4.0   |  25  |  0.64    Ca    8.6      30 Mar 2018 06:14  Mg     2.1         TPro  7.5  /  Alb  3.8  /  TBili  0.6  /  DBili  x   /  AST  20  /  ALT  19  /  AlkPhos  98        Urinalysis Basic - ( 29 Mar 2018 16:35 )    Color: Yellow / Appearance: Clear / S.015 / pH: x  Gluc: x / Ketone: NEGATIVE  / Bili: NEGATIVE / Urobili: 0.2 E.U./dL   Blood: x / Protein: NEGATIVE mg/dL / Nitrite: NEGATIVE   Leuk Esterase: Trace / RBC: < 5 /HPF / WBC < 5 /HPF   Sq Epi: x / Non Sq Epi: Few /HPF / Bacteria: Present /HPF          Radiology:    < from: CT Brain Stroke Protocol (18 @ 15:26) >  EXAM:  CT BRAIN STROKE PROTOCOL                           PROCEDURE DATE:  2018                     INTERPRETATION:  PROCEDURE: CT brain without intravenous contrast.    INDICATION: Numbness; CVA    TECHNIQUE: Multiple axial sections were obtained at 5 mm intervals.   Sagittal coronal reformatted images were obtained from the axial data set   The images were reviewed in brain and bone windows.    COMPARISON: None    FINDINGS: The CT examination demonstrates the ventricles, cisternal   spaces, and cortical sulci to be appropriate for the patient's stated   age. There is no midline shift or extra axial collections. The gray white   differentiation appears within normal limits. There is no intracranial   hemorrhage or acute transcorticalinfarct. There is patchy areas of   hypodensity within the periventricular white matter which may represent   the sequela of small vessel ischemic disease. There are focal areas of   hypodensity within the right external capsule and basal ganglia which may   represent lacunar infarcts of indeterminate age (series 2 image 9). The   bony windows demonstrates no fractures. The visualized paranasal sinuses   are within normal limits. The mastoid air cells are well aerated.    The study was performed at 3:20 pm  and the above findings were discussed   with NITISH Ortega at 3:30 pm.    IMPRESSION: No intracranial hemorrhage or acute transcortical infarct.   Right capsular and basal ganglia lacunar infarcts of indeterminate age.   Minimum small vessel ischemic disease.          < from: MR Head No Cont (18 @ 23:02) >  INTERPRETATION:  PROCEDURE: MRI Brain without contrast    INDICATION: Rule out CVA. Left-sided facial numbness and left hand   numbness.    TECHNIQUE: SagittalT1-w SPGR volumetric with axial and coronal   reformations, axial T2 GRASE, T2-FLAIR, T2-FFE and diffusion weighted   imaging of the brain is obtained.    COMPARISON: None.    FINDINGS: The MRI examination of the brain demonstrates the ventricles,   cisternal spaces, and cortical sulci to be appropriate for the patient's   stated age. There is no midline shift or extra-axial collection.     There is an area of T2 hyperintensity within the right external capsule,   which likely represents a chronic lacunar infarct. There is mild   periventricular white matter disease, consistent with chronic   microangiopathic ischemic disease. The diffusion-weighted images   demonstrate a focus within the right thalamus, which demonstrates   restricted diffusion on diffusion-weighted images, as well as decreased   intensity on ADC images. This is consistent with acute cerebral   infarction. The susceptibility weighted images demonstrate no parenchymal   blood products. There are preserved vascular flow-voids.     There is a small amount of fluid within the left ethmoid air cells. The   visualized paranasal sinuses are otherwise free of mucosal disease. The   mastoid air cells are well-aerated.    IMPRESSION:   1.  Findings consistent with an acute right thalamic infarct.  2.  Chronic white matter microangiopathic ischemic disease. Chronic   lacunar infarct involving the right subcortical insular              Vital Signs Last 24 Hrs  T(C): 36.8 (30 Mar 2018 05:03), Max: 36.9 (30 Mar 2018 01:00)  T(F): 98.3 (30 Mar 2018 05:03), Max: 98.5 (30 Mar 2018 01:00)  HR: 64 (30 Mar 2018 04:48) (59 - 82)  BP: 168/78 (30 Mar 2018 04:48) (136/79 - 170/79)  BP(mean): 110 (30 Mar 2018 04:48) (102 - 114)  RR: 18 (30 Mar 2018 04:48) (16 - 18)  SpO2: 95% (30 Mar 2018 04:48) (94% - 100%)    REVIEW OF SYSTEMS:    CONSTITUTIONAL: No fever, weight loss, or fatigue  EYES: No eye pain, visual disturbances, or discharge  ENMT:  No difficulty hearing, tinnitus, vertigo; No sinus or throat pain  NECK: No pain or stiffness  BREASTS: No pain, masses, or nipple discharge  RESPIRATORY: No cough, wheezing, chills or hemoptysis; No shortness of breath  CARDIOVASCULAR: No chest pain, palpitations, dizziness, or leg swelling  GASTROINTESTINAL: No abdominal or epigastric pain. No nausea, vomiting, or hematemesis; No diarrhea or constipation. No melena or hematochezia.  GENITOURINARY: No dysuria, frequency, hematuria, or incontinence  NEUROLOGICAL: left facial numbness/ improved since admission  SKIN: No itching, burning, rashes, or lesions   LYMPH NODES: No enlarged glands  ENDOCRINE: No heat or cold intolerance; No hair loss  MUSCULOSKELETAL: No joint pain or swelling; No muscle, back, or extremity pain  PSYCHIATRIC: No depression, anxiety, mood swings, or difficulty sleeping  HEME/LYMPH: No easy bruising, or bleeding gums  ALLERGY AND IMMUNOLOGIC: No hives or eczema      Physical Exam: WDWN  woman lying in semi Fowlers position, feels numbness left facial area but noted improvement since admission    HEENT: normocephalic,  anicteric,  atraumatic    Neck: supple, negative JVD, negative carotid bruits,    Chest: CTA bilaterally, neg wheeze, rhonchi, rales, crackles, egophany    Cardiovascular: regular rate and rhythm, neg murmurs/rubs/gallops    Abdomen: soft, non distended, non tender, negative rebound/guarding, normal bowel sounds, neg hepatosplenomegaly    Extremities: WWP, neg cyanosis/clubbing/edema, negative calf tenderness to palpation, negative Rebeca's sign    :     Neurologic Exam:    Alert and oriented to person, place, date/year, speech fluent w/o dysarthria, repetition intact, comprehension intact,     Cranial Nerves:     II:                       pupils equal, round and reactive to light, visual fields intact   III/ IV/VI:             extraocular movements intact, neg nystagmus, ptosis  V:                      decreased light touch Left V1-3   VII:                     flattening of Left NLF,, normal eye closure and smile  VIII:                    hearing intact to finger rub bilaterally  IX/ X:                 soft palate rise symmetrical  XI:                      head turning, shoulder shrug normal  XII:                     tongue midline    Motor Exam:    Bilateral UE:         5/5 /intrinsics                            5/5 deltoid/biceps/triceps/wrist extensors-flexors                            negative pronator drift      Bilateral LE:         5/5 hip flexors/adductors/abductors                            5/5 quadriceps/hamstrings                            5/5 dorsiflexors/plantar flexors/invertors-evertors    Sensory:              intact to LT/PP in all UE/LE dermatomes    DTR:                   2+ = biceps/     triceps/     brachioradialis                            2+ = patella/   medial hamstring/    ankle                            neg clonus                            neg Babinski                            neg Hoffmans    Finger to Nose:  wnl    Heel to Shin:       wnl    Rapid Alternating movements:   wnl    Joint Position Sense:  intact    Romberg:  not tested    Tandem Walking:  not tested    Gait:  not tested        PM&R Impression:    1) s/p acute left thalamic infarct  2) no focal weakness      Recommendations:    1) Physical therapy focusing on therapeutic exercises, bed mobility/transfer out of bed evaluation, progressive ambulation with assistive devices prn    2) Disposition Plan/Recommendations:  anticipate d/c home with no post d/c rehab needs

## 2018-03-30 NOTE — OCCUPATIONAL THERAPY INITIAL EVALUATION ADULT - LIGHT TOUCH SENSATION, LLE, REHAB EVAL
within normal limits/Pt reports mild numbness in LLE, light touch intact, pt reports numbness is much better than on admission

## 2018-03-30 NOTE — OCCUPATIONAL THERAPY INITIAL EVALUATION ADULT - PERTINENT HX OF CURRENT PROBLEM, REHAB EVAL
Pt is 72F with history of hypothyroid and hypertension, presented with left sided numbness in hand, face, leg, and foot. CTH + R capsular and basal ganglia lacunar infarcts.

## 2018-03-30 NOTE — DISCHARGE NOTE ADULT - CARE PROVIDER_API CALL
Nolvia Resendiz), Neurology; Vascular Neurology  [address], [place] [address]  Phone: [phone]  Fax: [phone]

## 2018-03-30 NOTE — STROKE CODE NOTE - SUBJECTIVE
3/29/2018 3:35PM  72 year-old female with PMH HTN, hyperlipidemia complains of left sided numbness upon wakening today at 2PM.  It started in her left hand but involves her left arm, face and top of her leg.  She didn't have it when she went to bed last night.  She's not sure if she woke up in middle of the night.  No specific weakness.  No speech or visual deficits.  No ataxia.  She never had similar symptoms in the past.    PMH: HTN, high cholesterol, DM - may be a little high, hypothyroid, uterine cancer s/p hysterectomy  - no CAD  All: NKDA  Meds: Zofran ODT 8 mg oral tablet, disintegratin tab(s) orally 3 times a day, Flagyl 500 mg oral tablet: 1 tab(s) orally every 8 hours, Cipro 500 mg oral tablet: 1 tab(s) orally every 12 hours, Synthroid 50 mcg (0.05 mg) oral tablet: 1 tab(s) orally once a day, Lisinopril 10 mg orally once a day  SH: former smoker - quit in her 20's  FH: aunt had mini-strokes in her 90's

## 2018-03-30 NOTE — OCCUPATIONAL THERAPY INITIAL EVALUATION ADULT - LIVES WITH, PROFILE
Pt lives alone in apartment, elevator access, no PENELOPE. Reports independence with I/ADLs and functional mobility./alone

## 2018-03-30 NOTE — DISCHARGE NOTE ADULT - MEDICATION SUMMARY - MEDICATIONS TO TAKE
I will START or STAY ON the medications listed below when I get home from the hospital:    aspirin 81 mg oral delayed release tablet  -- 1 tab(s) by mouth once a day  -- Indication: For Cerebrovascular accident (CVA), unspecified mechanism    atorvastatin 80 mg oral tablet  -- 1 tab(s) by mouth once a day (at bedtime)  -- Indication: For Cerebrovascular accident (CVA), unspecified mechanism    Synthroid 50 mcg (0.05 mg) oral tablet  -- 1 tab(s) by mouth once a day  -- Indication: For Hypothyroidism, unspecified type I will START or STAY ON the medications listed below when I get home from the hospital:    aspirin 81 mg oral delayed release tablet  -- 1 tab(s) by mouth once a day  -- Indication: For CVA    atorvastatin 80 mg oral tablet  -- 1 tab(s) by mouth once a day (at bedtime)  -- Indication: For HLD    Synthroid 50 mcg (0.05 mg) oral tablet  -- 1 tab(s) by mouth once a day  -- Indication: For Hypothyroidism, unspecified type

## 2018-03-30 NOTE — STROKE CODE NOTE - OBJECTIVE
O: General - sitting in bed, NAD  CV: RRR  Extremities: 2+ radial pulses bilaterally  Neuro -   MS - AA&Ox3, fluent, no dysarthria, follows all commands, fund of knowledge intact, memory intact  CN - EOMI, PERRL+, VFF, no nystagmus, DECREASED facial sensation to 90% on left side, no facial droop, palatal elevation intact, tongue midline  Motor - No pronator drift, 5/5 x4, normal bulk and tone  Sensory - DECREASED light touch on left forearm to 80%, no difference in her leg  Cerebellar - FNF and HKS intact bilaterally  Gait - deferred    CT Head - no acute pathology.

## 2018-03-30 NOTE — OCCUPATIONAL THERAPY INITIAL EVALUATION ADULT - NS ASR FOLLOW COMMAND OT EVAL
100% of the time/able to follow multistep instructions/Pt demonstrated appropriate attention, concentration, intact reading and vision. Able to list months of the year in reverse order accurately, following single and multi-step commands.

## 2018-03-30 NOTE — OCCUPATIONAL THERAPY INITIAL EVALUATION ADULT - DIAGNOSIS, OT EVAL
Pt demonstrates independence with functional mobility and self-care. Reported numbness in left hand has improved since admission and does not limit pt's ability to function. Pt presents with no further OT needs at this time, please re-consult if status changes.

## 2018-03-30 NOTE — OCCUPATIONAL THERAPY INITIAL EVALUATION ADULT - RANGE OF MOTION EXAMINATION
no Passive ROM deficits were identified/trunk was WNL (within normal limits)/trunk was WFL (within functional limits)/no Active ROM deficits were identified

## 2018-03-30 NOTE — DISCHARGE NOTE ADULT - HOSPITAL COURSE
72 year old woman with hypothyroidism who presented with left sided numbness of 1 day duration of L hand, Left face, and left lower extremity.  Patient went to [facility].  At [facility] T98, /80, HR82, O2Sat 100% on RA. She got progressively more hypertensive while at [facility]. Stroke code was called. NIHSS 2. Given ASA 325mg. CTH  showed R capsular and basal ganglia lacunar infarcts. Prior to transport to [facility] patient states numbness improved. MRI showing thalamic infarct. Started ASA, Lipitor. Patient echo bubble study without clot or PFO. Sent hypercoagulable workup.  Patient to follow up with Dr. Resendiz April 25th as an outpatient.

## 2018-03-30 NOTE — OCCUPATIONAL THERAPY INITIAL EVALUATION ADULT - GENERAL OBSERVATIONS, REHAB EVAL
Pt is right hand dominant female, cleared for OT by LISHA Jacobson covering for LISHA De Leon. Pt is right hand dominant female, cleared for OT by LISHA Jacobson covering for LISHA De Leon. Pt received in supine, NAD, +PIV heplock, + tele, cleared to walk off tele in room. Pt reported no pain throughout session.

## 2018-03-30 NOTE — DISCHARGE NOTE ADULT - PLAN OF CARE
Prevention of future strokes You were found to have a stroke on CT scan and on MRI.  You were started on aspirin and atorvastatin.  You had labs drawn for further workup.  Please follow up with Dr. Resendiz in 4 weeks. On Wednesday April 25th 12:00pm Phone: [phone]. Please continue with your home medications as prescribed.

## 2018-03-30 NOTE — STROKE CODE NOTE - ASSESSMENT/PLAN
72 year-old female with some stroke risk factors presents with left sided numbness noted upon awakening that possibly stroke.  - no tPA since outside window.  - no thrombectomy since no large vessel occlusion    Plan:  1) Admit to stroke unit for further workup including -   a) MRI Brain without nelda to localize infarct  b) Echocardiogram with bubble  c) Lipid profile  d) Hemoglobin A1C  3) Physical therapy    2) Passed dysphagia screen so start Aspirin 325mg today followed by 81mg tomorrow.  3) Start Atorvastatin 80mg for statin.  4) DVT prophylaxis

## 2018-03-31 LAB
B2 GLYCOPROT1 AB SER QL: NEGATIVE — SIGNIFICANT CHANGE UP
CARDIOLIPIN AB SER-ACNC: NEGATIVE — SIGNIFICANT CHANGE UP
HCYS SERPL-MCNC: 6.2 UMOL/L — SIGNIFICANT CHANGE UP (ref 5–20)

## 2018-04-02 LAB
AT III ACT/NOR PPP CHRO: 84 % — LOW (ref 85–135)
PROT C ACT/NOR PPP: 97 % — SIGNIFICANT CHANGE UP (ref 74–150)
PROT S FREE AG PPP IA-ACNC: 81 % — SIGNIFICANT CHANGE UP (ref 61–131)

## 2018-04-03 DIAGNOSIS — E03.9 HYPOTHYROIDISM, UNSPECIFIED: ICD-10-CM

## 2018-04-03 DIAGNOSIS — I63.9 CEREBRAL INFARCTION, UNSPECIFIED: ICD-10-CM

## 2018-04-03 DIAGNOSIS — Z90.710 ACQUIRED ABSENCE OF BOTH CERVIX AND UTERUS: ICD-10-CM

## 2018-04-03 DIAGNOSIS — Z87.891 PERSONAL HISTORY OF NICOTINE DEPENDENCE: ICD-10-CM

## 2018-04-03 DIAGNOSIS — Z85.42 PERSONAL HISTORY OF MALIGNANT NEOPLASM OF OTHER PARTS OF UTERUS: ICD-10-CM

## 2018-04-03 DIAGNOSIS — R29.702 NIHSS SCORE 2: ICD-10-CM

## 2018-04-03 DIAGNOSIS — I10 ESSENTIAL (PRIMARY) HYPERTENSION: ICD-10-CM

## 2018-04-03 LAB — APCR PPP: 2.45 RATIO — SIGNIFICANT CHANGE UP

## 2018-04-04 LAB
CONFIRM APTT STACLOT: NEGATIVE — SIGNIFICANT CHANGE UP
DNA PLOIDY SPEC FC-IMP: SIGNIFICANT CHANGE UP
DRVVT SCREEN TO CONFIRM RATIO: SIGNIFICANT CHANGE UP
LA NT DPL PPP QL: 23.6 SEC — SIGNIFICANT CHANGE UP
MTHFR GENE INTERPRETATION: SIGNIFICANT CHANGE UP
PTR INTERPRETATION: SIGNIFICANT CHANGE UP

## 2018-04-25 ENCOUNTER — APPOINTMENT (OUTPATIENT)
Dept: NEUROLOGY | Facility: CLINIC | Age: 73
End: 2018-04-25
Payer: MEDICARE

## 2018-04-25 VITALS
HEIGHT: 62 IN | WEIGHT: 138 LBS | TEMPERATURE: 97.7 F | HEART RATE: 62 BPM | DIASTOLIC BLOOD PRESSURE: 94 MMHG | OXYGEN SATURATION: 96 % | SYSTOLIC BLOOD PRESSURE: 159 MMHG | BODY MASS INDEX: 25.4 KG/M2

## 2018-04-25 DIAGNOSIS — Z78.9 OTHER SPECIFIED HEALTH STATUS: ICD-10-CM

## 2018-04-25 DIAGNOSIS — Z80.3 FAMILY HISTORY OF MALIGNANT NEOPLASM OF BREAST: ICD-10-CM

## 2018-04-25 DIAGNOSIS — Z60.2 PROBLEMS RELATED TO LIVING ALONE: ICD-10-CM

## 2018-04-25 PROCEDURE — 99215 OFFICE O/P EST HI 40 MIN: CPT

## 2018-04-25 RX ORDER — ATORVASTATIN CALCIUM 80 MG/1
80 TABLET, FILM COATED ORAL
Qty: 30 | Refills: 0 | Status: DISCONTINUED | COMMUNITY
Start: 2018-03-30 | End: 2018-04-25

## 2018-04-25 SDOH — SOCIAL STABILITY - SOCIAL INSECURITY: PROBLEMS RELATED TO LIVING ALONE: Z60.2

## 2018-07-31 ENCOUNTER — APPOINTMENT (OUTPATIENT)
Dept: NEUROLOGY | Facility: CLINIC | Age: 73
End: 2018-07-31
Payer: MEDICARE

## 2018-07-31 VITALS
HEART RATE: 73 BPM | HEIGHT: 62 IN | WEIGHT: 138 LBS | BODY MASS INDEX: 25.4 KG/M2 | DIASTOLIC BLOOD PRESSURE: 96 MMHG | OXYGEN SATURATION: 98 % | SYSTOLIC BLOOD PRESSURE: 141 MMHG | TEMPERATURE: 97.9 F

## 2018-07-31 PROBLEM — Z78.9 NON-SMOKER: Status: ACTIVE | Noted: 2018-04-25

## 2018-07-31 PROBLEM — Z80.3 FAMILY HISTORY OF MALIGNANT NEOPLASM OF BREAST: Status: ACTIVE | Noted: 2018-04-25

## 2018-07-31 PROBLEM — Z60.2 LIVES ALONE WITHOUT HELP AVAILABLE: Status: ACTIVE | Noted: 2018-04-25

## 2018-07-31 PROCEDURE — 99214 OFFICE O/P EST MOD 30 MIN: CPT

## 2018-07-31 RX ORDER — ATORVASTATIN CALCIUM 40 MG/1
40 TABLET, FILM COATED ORAL DAILY
Qty: 90 | Refills: 1 | Status: DISCONTINUED | COMMUNITY
Start: 2018-04-25 | End: 2018-07-31

## 2018-07-31 RX ORDER — ROSUVASTATIN CALCIUM 10 MG/1
10 TABLET, FILM COATED ORAL
Refills: 0 | Status: ACTIVE | COMMUNITY

## 2019-01-28 ENCOUNTER — TRANSCRIPTION ENCOUNTER (OUTPATIENT)
Age: 74
End: 2019-01-28

## 2019-01-28 ENCOUNTER — APPOINTMENT (OUTPATIENT)
Dept: NEUROLOGY | Facility: CLINIC | Age: 74
End: 2019-01-28
Payer: MEDICARE

## 2019-01-28 VITALS
OXYGEN SATURATION: 99 % | WEIGHT: 145 LBS | HEART RATE: 69 BPM | SYSTOLIC BLOOD PRESSURE: 170 MMHG | BODY MASS INDEX: 26.68 KG/M2 | HEIGHT: 62 IN | DIASTOLIC BLOOD PRESSURE: 106 MMHG

## 2019-01-28 DIAGNOSIS — E78.49 OTHER HYPERLIPIDEMIA: ICD-10-CM

## 2019-01-28 PROCEDURE — 99214 OFFICE O/P EST MOD 30 MIN: CPT

## 2019-01-28 NOTE — PHYSICAL EXAM
[FreeTextEntry1] : O: General - sitting in chair, NAD\par CV: RRR\par Extremities: 2+ radial pulses bilaterally\par \par Neurological exam: \par Mental status - AA&Ox3, fluent, no dysarthria, follows all commands, able provide full history, memory intact\par Cranial nerves - EOMI, VFF, no nystagmus, facial sensation mainly intact, no facial droop, tongue midline\par Motor - No pronator drift, 5/5 x4, normal bulk and tone\par Sensory - Intact light touch and pinprick bilaterally\par Cerebellar - FNF intact bilaterally\par Gait - steady

## 2019-01-28 NOTE — HISTORY OF PRESENT ILLNESS
[FreeTextEntry1] : 73 year-old female with PMH HTN and hypothyroid presents for follow up.  Her left hand and small toe still have slight numbness but don't impair her activities.  Denies specific weakness.  No speech or visual deficits.  \par \par She's compliant with her medications - \par Aspirin 81mg - No bleeding in urine or stool.  \par Statin - No muscle pains or cramps on Crestor.  Did repeat lipid profile as part of labs.\par \par Stroke risk factors - \par HTN - No headache.  She thinks that her anxiety post bad commute.  She thinks that it's normal when she's relaxed at home.    \par Hyperlipidemia - on statin.

## 2019-01-28 NOTE — ASSESSMENT
[FreeTextEntry1] : Assessment: 72 year-old female with PMH HTN, hyperlipidemia presents for follow up of altered sensation secondary to thalamic infarct that have mainly resolved.\par \par Plan for stroke:\par 1) Secondary stroke prevention - \par a) Continue Aspirin 81mg for antiplatelet. \par - Ordered accumetrics to assess appropriate dose of Aspirin.  \par b) Continue Crestor 10mg for statin since she's had a good response based on recent lab results.  LDL goal is less than 100\par \par 2) Stroke risk factors - most important intervention given infarct's size and location - \par a) HTN - She should continue to monitor at home and discuss with her PMD.  sbp goal is less than 140/80.\par b) Hyperlipidemia - see above\par \par Plan for memory lapses - doubt related to her stroke or the statin.  She refused Neuropsychological testing at this time but agrees to monitor and bring log to next appointment. \par \par Thank you for allowing me to participate in the care of your patient.  If you have any questions, please feel free to call me at [phone].

## 2019-01-28 NOTE — CONSULT LETTER
[Dear  ___] : Dear  [unfilled], [Courtesy Letter:] : I had the pleasure of seeing your patient, [unfilled], in my office today. [FreeTextEntry2] : Gerardo Esposito MD\par [address] [facility]\[place] [address]

## 2019-01-28 NOTE — DATA REVIEWED
[de-identified] : Labs (1/16/2019): \par CBC, CMP - WNL\par Lipid profile: LDL 60, HDL 74, Triglycerides 113, Total 157\par Hemoglobin A1C 5.5%\par TSH 1.45

## 2019-03-12 ENCOUNTER — APPOINTMENT (OUTPATIENT)
Dept: RADIOLOGY | Facility: CLINIC | Age: 74
End: 2019-03-12

## 2019-03-12 ENCOUNTER — OUTPATIENT (OUTPATIENT)
Dept: OUTPATIENT SERVICES | Facility: HOSPITAL | Age: 74
LOS: 1 days | End: 2019-03-12
Payer: MEDICARE

## 2019-03-12 DIAGNOSIS — Z90.710 ACQUIRED ABSENCE OF BOTH CERVIX AND UTERUS: Chronic | ICD-10-CM

## 2019-03-12 PROCEDURE — 72190 X-RAY EXAM OF PELVIS: CPT

## 2019-03-12 PROCEDURE — 72190 X-RAY EXAM OF PELVIS: CPT | Mod: 26

## 2019-03-12 PROCEDURE — 72100 X-RAY EXAM L-S SPINE 2/3 VWS: CPT

## 2019-03-12 PROCEDURE — 72100 X-RAY EXAM L-S SPINE 2/3 VWS: CPT | Mod: 26

## 2019-04-04 LAB — PLATELET RESPONSE ASPIRIN: 417 ARU

## 2019-06-04 ENCOUNTER — TRANSCRIPTION ENCOUNTER (OUTPATIENT)
Age: 74
End: 2019-06-04

## 2019-06-07 ENCOUNTER — APPOINTMENT (OUTPATIENT)
Dept: NEUROLOGY | Facility: CLINIC | Age: 74
End: 2019-06-07
Payer: MEDICARE

## 2019-06-07 VITALS
OXYGEN SATURATION: 96 % | HEIGHT: 62 IN | SYSTOLIC BLOOD PRESSURE: 154 MMHG | HEART RATE: 67 BPM | DIASTOLIC BLOOD PRESSURE: 101 MMHG | WEIGHT: 147 LBS | BODY MASS INDEX: 27.05 KG/M2

## 2019-06-07 DIAGNOSIS — R20.9 OTHER SEQUELAE OF CEREBRAL INFARCTION: ICD-10-CM

## 2019-06-07 DIAGNOSIS — I10 ESSENTIAL (PRIMARY) HYPERTENSION: ICD-10-CM

## 2019-06-07 DIAGNOSIS — I69.398 OTHER SEQUELAE OF CEREBRAL INFARCTION: ICD-10-CM

## 2019-06-07 PROCEDURE — 99213 OFFICE O/P EST LOW 20 MIN: CPT

## 2019-06-07 PROCEDURE — 93880 EXTRACRANIAL BILAT STUDY: CPT

## 2019-06-07 RX ORDER — ASPIRIN ENTERIC COATED TABLETS 81 MG 81 MG/1
81 TABLET, DELAYED RELEASE ORAL DAILY
Refills: 0 | Status: ACTIVE | COMMUNITY

## 2019-06-07 RX ORDER — LEVOTHYROXINE SODIUM 0.05 MG/1
50 TABLET ORAL DAILY
Refills: 0 | Status: ACTIVE | COMMUNITY
Start: 2018-04-13

## 2019-06-07 RX ORDER — LOSARTAN POTASSIUM 50 MG/1
50 TABLET, FILM COATED ORAL DAILY
Refills: 0 | Status: ACTIVE | COMMUNITY

## 2019-06-10 PROBLEM — I69.398 ALTERATION OF SENSATIONS, POST-STROKE: Status: ACTIVE | Noted: 2018-04-25

## 2019-06-17 NOTE — CONSULT LETTER
[Dear  ___] : Dear  [unfilled], [Courtesy Letter:] : I had the pleasure of seeing your patient, [unfilled], in my office today. [FreeTextEntry2] : Gerardo Esposito MD\par [address] [facility] [place] [address]

## 2019-06-17 NOTE — HISTORY OF PRESENT ILLNESS
[FreeTextEntry1] : 73 year-old female with PMH HTN and hypothyroid presents for follow up.  Her left hand and small toe still have slight numbness but don't impair her activities.  It's less on her hand than foot.  No falls or trauma.  She went to see an Orthopedist, Dr. Dale Austin, who did x-ray of lumbar spine and said that it's not stroke related, and is now doing PT once a week for lower back.  She has a follow up in 3 weeks and 3 more session of PT.  Denies specific weakness.  No speech or visual deficits.  \par \par She's compliant with her medications - \par Aspirin 81mg - No bleeding in urine or stool.  \par Statin - No muscle pains or cramps on Crestor.  Did repeat lipid profile as part of labs.\par \par Stroke risk factors - \par HTN - No headache.   She thinks that it's normal when she's relaxed at home.    \par Hyperlipidemia - on statin. \par \par Memory: she reports it is not great. She says long term is good but short term is absent minded- no confusion, nor disoriented. She lives by herself but is in contact with people daily and gets checked on. She doesn't forget the stove on, etc. She's able to complete all activities of daily living.

## 2019-06-17 NOTE — DATA REVIEWED
[de-identified] : \par Labs (5/28/2019): \par CBC, CMP - WNL\par Lipid profile: LDL 52, HDL 64, Triglycerides 111, Total 135\par Hemoglobin A1C 5.4%\par TSH 1.10\par creatinine 0.67L BUN/creatinine ratio 24H\par \par Labs (1/16/2019): \par CBC, CMP - WNL\par Lipid profile: LDL 60, HDL 74, Triglycerides 113, Total 157\par Hemoglobin A1C 5.5%\par TSH 1.45\par \par \par

## 2019-06-17 NOTE — PHYSICAL EXAM
[FreeTextEntry1] : O: General - sitting in chair, NAD\par Eyes: anicteric sclera\par CV: RRR\par Extremities: 2+ radial pulses bilaterally\par \par Neurological exam: \par Mental status - AA&Ox3, fluent, no dysarthria, follows all commands, able provide full history, memory intact\par Cranial nerves - EOMI, VFF, no nystagmus, facial sensation mainly intact, no facial droop, tongue midline\par Motor - No pronator drift, 5/5 x4, normal bulk and tone\par Sensory - Intact light touch and pinprick bilaterally\par Cerebellar - FNF intact bilaterally\par Gait - steady

## 2019-06-17 NOTE — ASSESSMENT
[FreeTextEntry1] : Assessment: 73 year-old female with PMH HTN, hyperlipidemia presents for follow up of altered sensation secondary to thalamic infarct that have mainly resolved. Neurological examination is intact.  \par \par In terms of short term memory loss, patient stopped in the middle of MOCA examination due to irritation. \par \par Plan for stroke:\par 1) Secondary stroke prevention - \par a) Continue Aspirin 81mg for antiplatelet.  \par b) Continue Crestor 10mg for statin since she's had a good response based on recent lab results.  LDL goal is less than 100\par \par 2) Stroke risk factors - most important intervention given infarct's size and location - \par a) HTN - She should continue to monitor at home and discuss with her PMD.  sbp goal is less than 140/80. Her repeat BP was 165/107- denies SOB, chest pain, or headache. \par b) Hyperlipidemia - see above\par c) reviewed her carotid doppler- normal \par \par 3) Prescribed Exercise RX\par \par Follow up in 6 months\par \par Plan for memory lapses - could be related to her thalamic stroke; doubt related to the statin.  \par Discussed benefits of Neuropsychological testing but she refused at this time.  She will keep monitoring on her own and discuss again at follow up appointments. \par \par Thank you for allowing me to participate in the care of your patient.  If you have any questions, please feel free to call me at [phone].

## 2019-07-01 ENCOUNTER — APPOINTMENT (OUTPATIENT)
Dept: NEUROLOGY | Facility: CLINIC | Age: 74
End: 2019-07-01

## 2020-12-02 ENCOUNTER — OUTPATIENT (OUTPATIENT)
Dept: OUTPATIENT SERVICES | Facility: HOSPITAL | Age: 75
LOS: 1 days | End: 2020-12-02

## 2020-12-02 ENCOUNTER — APPOINTMENT (OUTPATIENT)
Dept: ULTRASOUND IMAGING | Facility: CLINIC | Age: 75
End: 2020-12-02
Payer: MEDICARE

## 2020-12-02 DIAGNOSIS — Z90.710 ACQUIRED ABSENCE OF BOTH CERVIX AND UTERUS: Chronic | ICD-10-CM

## 2020-12-02 PROCEDURE — 76536 US EXAM OF HEAD AND NECK: CPT | Mod: 26

## 2021-02-08 NOTE — ED PROVIDER NOTE - QRS
Pt c/o intermittent right side chest pain, just above right breast. States sometimes it is sharp. Started today. Only lasts a few minutes at a time and does not radiate. Feels slightly worse if pt takes a deep breath in.   Pt states she is lying down currently.   Denies dizziness, diaphoresis, SOB, recent illness. Reports she is able to ambulate around her apartment without issue. Pt speaking in full, clear sentences during call. Does not sound distressed.     She reports not sleeping well last night. States her  and \"the lady he's living with\" came to her home last night. Their interaction did not go well.   Pt concerned her CP could be related to her heart or a \"warning sign of a stroke.\" Provided reassurance.   Advised rest; will send message to PCP for review and recommendations. Pt verbalized understanding and agreement with plan of care.        74

## 2021-05-17 ENCOUNTER — EMERGENCY (EMERGENCY)
Facility: HOSPITAL | Age: 76
LOS: 1 days | Discharge: HOME | End: 2021-05-17
Admitting: EMERGENCY MEDICINE
Payer: MEDICARE

## 2021-05-17 VITALS
SYSTOLIC BLOOD PRESSURE: 135 MMHG | RESPIRATION RATE: 18 BRPM | OXYGEN SATURATION: 95 % | DIASTOLIC BLOOD PRESSURE: 81 MMHG | TEMPERATURE: 98 F | HEART RATE: 65 BPM

## 2021-05-17 VITALS
RESPIRATION RATE: 18 BRPM | SYSTOLIC BLOOD PRESSURE: 144 MMHG | HEART RATE: 84 BPM | TEMPERATURE: 97 F | DIASTOLIC BLOOD PRESSURE: 98 MMHG | WEIGHT: 149.91 LBS | OXYGEN SATURATION: 98 %

## 2021-05-17 DIAGNOSIS — R36.9 URETHRAL DISCHARGE, UNSPECIFIED: ICD-10-CM

## 2021-05-17 DIAGNOSIS — Z90.710 ACQUIRED ABSENCE OF BOTH CERVIX AND UTERUS: Chronic | ICD-10-CM

## 2021-05-17 DIAGNOSIS — N39.0 URINARY TRACT INFECTION, SITE NOT SPECIFIED: ICD-10-CM

## 2021-05-17 DIAGNOSIS — I10 ESSENTIAL (PRIMARY) HYPERTENSION: ICD-10-CM

## 2021-05-17 DIAGNOSIS — Z20.822 CONTACT WITH AND (SUSPECTED) EXPOSURE TO COVID-19: ICD-10-CM

## 2021-05-17 DIAGNOSIS — E03.9 HYPOTHYROIDISM, UNSPECIFIED: ICD-10-CM

## 2021-05-17 LAB
ALBUMIN SERPL ELPH-MCNC: 3.6 G/DL — SIGNIFICANT CHANGE UP (ref 3.4–5)
ALP SERPL-CCNC: 104 U/L — SIGNIFICANT CHANGE UP (ref 40–120)
ALT FLD-CCNC: 28 U/L — SIGNIFICANT CHANGE UP (ref 12–42)
ANION GAP SERPL CALC-SCNC: 9 MMOL/L — SIGNIFICANT CHANGE UP (ref 9–16)
APPEARANCE UR: CLEAR — SIGNIFICANT CHANGE UP
AST SERPL-CCNC: 20 U/L — SIGNIFICANT CHANGE UP (ref 15–37)
BACTERIA # UR AUTO: ABNORMAL /HPF
BASOPHILS # BLD AUTO: 0.03 K/UL — SIGNIFICANT CHANGE UP (ref 0–0.2)
BASOPHILS NFR BLD AUTO: 0.3 % — SIGNIFICANT CHANGE UP (ref 0–2)
BILIRUB SERPL-MCNC: 0.7 MG/DL — SIGNIFICANT CHANGE UP (ref 0.2–1.2)
BILIRUB UR-MCNC: NEGATIVE — SIGNIFICANT CHANGE UP
BUN SERPL-MCNC: 17 MG/DL — SIGNIFICANT CHANGE UP (ref 7–23)
CALCIUM SERPL-MCNC: 9.6 MG/DL — SIGNIFICANT CHANGE UP (ref 8.5–10.5)
CHLORIDE SERPL-SCNC: 106 MMOL/L — SIGNIFICANT CHANGE UP (ref 96–108)
CO2 SERPL-SCNC: 28 MMOL/L — SIGNIFICANT CHANGE UP (ref 22–31)
COLOR SPEC: YELLOW — SIGNIFICANT CHANGE UP
CREAT SERPL-MCNC: 0.83 MG/DL — SIGNIFICANT CHANGE UP (ref 0.5–1.3)
DIFF PNL FLD: ABNORMAL
EOSINOPHIL # BLD AUTO: 0.1 K/UL — SIGNIFICANT CHANGE UP (ref 0–0.5)
EOSINOPHIL NFR BLD AUTO: 1.1 % — SIGNIFICANT CHANGE UP (ref 0–6)
GLUCOSE SERPL-MCNC: 120 MG/DL — HIGH (ref 70–99)
GLUCOSE UR QL: NEGATIVE — SIGNIFICANT CHANGE UP
HCT VFR BLD CALC: 40.1 % — SIGNIFICANT CHANGE UP (ref 34.5–45)
HGB BLD-MCNC: 13.4 G/DL — SIGNIFICANT CHANGE UP (ref 11.5–15.5)
IMM GRANULOCYTES NFR BLD AUTO: 0.6 % — SIGNIFICANT CHANGE UP (ref 0–1.5)
KETONES UR-MCNC: NEGATIVE — SIGNIFICANT CHANGE UP
LACTATE SERPL-SCNC: 1.5 MMOL/L — SIGNIFICANT CHANGE UP (ref 0.4–2)
LEUKOCYTE ESTERASE UR-ACNC: ABNORMAL
LIDOCAIN IGE QN: 99 U/L — SIGNIFICANT CHANGE UP (ref 73–393)
LYMPHOCYTES # BLD AUTO: 1.31 K/UL — SIGNIFICANT CHANGE UP (ref 1–3.3)
LYMPHOCYTES # BLD AUTO: 14.9 % — SIGNIFICANT CHANGE UP (ref 13–44)
MAGNESIUM SERPL-MCNC: 2.1 MG/DL — SIGNIFICANT CHANGE UP (ref 1.6–2.6)
MCHC RBC-ENTMCNC: 31.7 PG — SIGNIFICANT CHANGE UP (ref 27–34)
MCHC RBC-ENTMCNC: 33.4 GM/DL — SIGNIFICANT CHANGE UP (ref 32–36)
MCV RBC AUTO: 94.8 FL — SIGNIFICANT CHANGE UP (ref 80–100)
MONOCYTES # BLD AUTO: 0.85 K/UL — SIGNIFICANT CHANGE UP (ref 0–0.9)
MONOCYTES NFR BLD AUTO: 9.7 % — SIGNIFICANT CHANGE UP (ref 2–14)
NEUTROPHILS # BLD AUTO: 6.46 K/UL — SIGNIFICANT CHANGE UP (ref 1.8–7.4)
NEUTROPHILS NFR BLD AUTO: 73.4 % — SIGNIFICANT CHANGE UP (ref 43–77)
NITRITE UR-MCNC: POSITIVE
NRBC # BLD: 0 /100 WBCS — SIGNIFICANT CHANGE UP (ref 0–0)
PH UR: 6.5 — SIGNIFICANT CHANGE UP (ref 5–8)
PLATELET # BLD AUTO: 210 K/UL — SIGNIFICANT CHANGE UP (ref 150–400)
POTASSIUM SERPL-MCNC: 3.8 MMOL/L — SIGNIFICANT CHANGE UP (ref 3.5–5.3)
POTASSIUM SERPL-SCNC: 3.8 MMOL/L — SIGNIFICANT CHANGE UP (ref 3.5–5.3)
PROT SERPL-MCNC: 7.3 G/DL — SIGNIFICANT CHANGE UP (ref 6.4–8.2)
PROT UR-MCNC: 100 MG/DL
RBC # BLD: 4.23 M/UL — SIGNIFICANT CHANGE UP (ref 3.8–5.2)
RBC # FLD: 13.5 % — SIGNIFICANT CHANGE UP (ref 10.3–14.5)
RBC CASTS # UR COMP ASSIST: ABNORMAL /HPF
SARS-COV-2 RNA SPEC QL NAA+PROBE: SIGNIFICANT CHANGE UP
SODIUM SERPL-SCNC: 143 MMOL/L — SIGNIFICANT CHANGE UP (ref 132–145)
SP GR SPEC: 1.02 — SIGNIFICANT CHANGE UP (ref 1–1.03)
UROBILINOGEN FLD QL: 0.2 E.U./DL — SIGNIFICANT CHANGE UP
WBC # BLD: 8.8 K/UL — SIGNIFICANT CHANGE UP (ref 3.8–10.5)
WBC # FLD AUTO: 8.8 K/UL — SIGNIFICANT CHANGE UP (ref 3.8–10.5)
WBC UR QL: ABNORMAL /HPF

## 2021-05-17 PROCEDURE — 99284 EMERGENCY DEPT VISIT MOD MDM: CPT | Mod: CS

## 2021-05-17 RX ORDER — CEFUROXIME AXETIL 250 MG
1 TABLET ORAL
Qty: 20 | Refills: 0
Start: 2021-05-17 | End: 2021-05-26

## 2021-05-17 RX ORDER — CEFTRIAXONE 500 MG/1
1000 INJECTION, POWDER, FOR SOLUTION INTRAMUSCULAR; INTRAVENOUS ONCE
Refills: 0 | Status: COMPLETED | OUTPATIENT
Start: 2021-05-17 | End: 2021-05-17

## 2021-05-17 RX ORDER — SODIUM CHLORIDE 9 MG/ML
1000 INJECTION INTRAMUSCULAR; INTRAVENOUS; SUBCUTANEOUS ONCE
Refills: 0 | Status: COMPLETED | OUTPATIENT
Start: 2021-05-17 | End: 2021-05-17

## 2021-05-17 RX ADMIN — SODIUM CHLORIDE 1000 MILLILITER(S): 9 INJECTION INTRAMUSCULAR; INTRAVENOUS; SUBCUTANEOUS at 12:48

## 2021-05-17 RX ADMIN — CEFTRIAXONE 1000 MILLIGRAM(S): 500 INJECTION, POWDER, FOR SOLUTION INTRAMUSCULAR; INTRAVENOUS at 13:13

## 2021-05-17 RX ADMIN — SODIUM CHLORIDE 1000 MILLILITER(S): 9 INJECTION INTRAMUSCULAR; INTRAVENOUS; SUBCUTANEOUS at 13:49

## 2021-05-17 RX ADMIN — CEFTRIAXONE 100 MILLIGRAM(S): 500 INJECTION, POWDER, FOR SOLUTION INTRAMUSCULAR; INTRAVENOUS at 12:47

## 2021-05-17 NOTE — ED PROVIDER NOTE - PATIENT PORTAL LINK FT
You can access the FollowMyHealth Patient Portal offered by [facility] by registering at the following website: [email or web address]. By joining [employer]’s FollowMyHealth portal, you will also be able to view your health information using other applications (apps) compatible with our system.

## 2021-05-17 NOTE — ED PROVIDER NOTE - CLINICAL SUMMARY MEDICAL DECISION MAKING FREE TEXT BOX
76 y/o F with Hx of HTN presenting with UTI symptoms. On exam, Pt appears well and in no acute distress. Plan for UA and routine labs. Will reassess clinically after results have been obtained.

## 2021-05-17 NOTE — ED PROVIDER NOTE - OBJECTIVE STATEMENT
76 y/o F with PMHx of HTN presents to the ED for urinary frequency. Pt reports she was given Antibiotics by her PCP for a possible UTI but it did not provide relief. She was advised to go to urgent care for further evaluation. Pt opted to come to the ED instead. She endorses having to urinate "every 5 minutes" with a burning sensation. She denies fevers, chills, or N/V/D.

## 2021-10-06 PROBLEM — I10 ESSENTIAL HYPERTENSION: Status: ACTIVE | Noted: 2018-07-31

## 2023-09-06 NOTE — ED ADULT NURSE NOTE - URINE COLOR
pink-tinged [Follow-up] : a follow-up of an existing diagnosis [FreeTextEntry1] : s/p EGD, colonoscopy

## 2023-10-15 ENCOUNTER — EMERGENCY (EMERGENCY)
Facility: HOSPITAL | Age: 78
LOS: 1 days | Discharge: HOME | End: 2023-10-15
Attending: EMERGENCY MEDICINE | Admitting: EMERGENCY MEDICINE
Payer: MEDICARE

## 2023-10-15 VITALS
WEIGHT: 192.02 LBS | DIASTOLIC BLOOD PRESSURE: 86 MMHG | SYSTOLIC BLOOD PRESSURE: 123 MMHG | RESPIRATION RATE: 18 BRPM | TEMPERATURE: 98 F | HEART RATE: 92 BPM | OXYGEN SATURATION: 95 %

## 2023-10-15 VITALS
SYSTOLIC BLOOD PRESSURE: 116 MMHG | RESPIRATION RATE: 16 BRPM | HEART RATE: 84 BPM | OXYGEN SATURATION: 97 % | TEMPERATURE: 98 F | DIASTOLIC BLOOD PRESSURE: 77 MMHG

## 2023-10-15 DIAGNOSIS — Z90.710 ACQUIRED ABSENCE OF BOTH CERVIX AND UTERUS: ICD-10-CM

## 2023-10-15 DIAGNOSIS — N39.0 URINARY TRACT INFECTION, SITE NOT SPECIFIED: ICD-10-CM

## 2023-10-15 DIAGNOSIS — Z90.710 ACQUIRED ABSENCE OF BOTH CERVIX AND UTERUS: Chronic | ICD-10-CM

## 2023-10-15 DIAGNOSIS — Z79.82 LONG TERM (CURRENT) USE OF ASPIRIN: ICD-10-CM

## 2023-10-15 DIAGNOSIS — E03.9 HYPOTHYROIDISM, UNSPECIFIED: ICD-10-CM

## 2023-10-15 DIAGNOSIS — R39.15 URGENCY OF URINATION: ICD-10-CM

## 2023-10-15 DIAGNOSIS — Z87.42 PERSONAL HISTORY OF OTHER DISEASES OF THE FEMALE GENITAL TRACT: ICD-10-CM

## 2023-10-15 DIAGNOSIS — I10 ESSENTIAL (PRIMARY) HYPERTENSION: ICD-10-CM

## 2023-10-15 LAB
ALBUMIN SERPL ELPH-MCNC: 3.6 G/DL — SIGNIFICANT CHANGE UP (ref 3.4–5)
ALP SERPL-CCNC: 90 U/L — SIGNIFICANT CHANGE UP (ref 40–120)
ALT FLD-CCNC: 24 U/L — SIGNIFICANT CHANGE UP (ref 12–42)
ANION GAP SERPL CALC-SCNC: 10 MMOL/L — SIGNIFICANT CHANGE UP (ref 9–16)
APPEARANCE UR: ABNORMAL
APTT BLD: 30.5 SEC — SIGNIFICANT CHANGE UP (ref 24.5–35.6)
AST SERPL-CCNC: 21 U/L — SIGNIFICANT CHANGE UP (ref 15–37)
BACTERIA # UR AUTO: ABNORMAL /HPF
BASOPHILS # BLD AUTO: 0.05 K/UL — SIGNIFICANT CHANGE UP (ref 0–0.2)
BASOPHILS NFR BLD AUTO: 0.5 % — SIGNIFICANT CHANGE UP (ref 0–2)
BILIRUB SERPL-MCNC: 0.8 MG/DL — SIGNIFICANT CHANGE UP (ref 0.2–1.2)
BILIRUB UR-MCNC: NEGATIVE — SIGNIFICANT CHANGE UP
BUN SERPL-MCNC: 19 MG/DL — SIGNIFICANT CHANGE UP (ref 7–23)
CALCIUM SERPL-MCNC: 9.5 MG/DL — SIGNIFICANT CHANGE UP (ref 8.5–10.5)
CHLORIDE SERPL-SCNC: 104 MMOL/L — SIGNIFICANT CHANGE UP (ref 96–108)
CO2 SERPL-SCNC: 25 MMOL/L — SIGNIFICANT CHANGE UP (ref 22–31)
COLOR SPEC: YELLOW — SIGNIFICANT CHANGE UP
CREAT SERPL-MCNC: 0.89 MG/DL — SIGNIFICANT CHANGE UP (ref 0.5–1.3)
DIFF PNL FLD: ABNORMAL
EGFR: 67 ML/MIN/1.73M2 — SIGNIFICANT CHANGE UP
EOSINOPHIL # BLD AUTO: 0.19 K/UL — SIGNIFICANT CHANGE UP (ref 0–0.5)
EOSINOPHIL NFR BLD AUTO: 1.9 % — SIGNIFICANT CHANGE UP (ref 0–6)
EPI CELLS # UR: PRESENT
GLUCOSE SERPL-MCNC: 120 MG/DL — HIGH (ref 70–99)
GLUCOSE UR QL: NEGATIVE MG/DL — SIGNIFICANT CHANGE UP
HCT VFR BLD CALC: 44.1 % — SIGNIFICANT CHANGE UP (ref 34.5–45)
HGB BLD-MCNC: 14.3 G/DL — SIGNIFICANT CHANGE UP (ref 11.5–15.5)
IMM GRANULOCYTES NFR BLD AUTO: 0.6 % — SIGNIFICANT CHANGE UP (ref 0–0.9)
INR BLD: 0.97 — SIGNIFICANT CHANGE UP (ref 0.85–1.18)
KETONES UR-MCNC: NEGATIVE MG/DL — SIGNIFICANT CHANGE UP
LACTATE BLDV-MCNC: 2 MMOL/L — SIGNIFICANT CHANGE UP (ref 0.5–2)
LACTATE BLDV-MCNC: 3.5 MMOL/L — HIGH (ref 0.5–2)
LEUKOCYTE ESTERASE UR-ACNC: ABNORMAL
LYMPHOCYTES # BLD AUTO: 1.72 K/UL — SIGNIFICANT CHANGE UP (ref 1–3.3)
LYMPHOCYTES # BLD AUTO: 17.1 % — SIGNIFICANT CHANGE UP (ref 13–44)
MCHC RBC-ENTMCNC: 31.4 PG — SIGNIFICANT CHANGE UP (ref 27–34)
MCHC RBC-ENTMCNC: 32.4 GM/DL — SIGNIFICANT CHANGE UP (ref 32–36)
MCV RBC AUTO: 96.7 FL — SIGNIFICANT CHANGE UP (ref 80–100)
MONOCYTES # BLD AUTO: 1 K/UL — HIGH (ref 0–0.9)
MONOCYTES NFR BLD AUTO: 9.9 % — SIGNIFICANT CHANGE UP (ref 2–14)
NEUTROPHILS # BLD AUTO: 7.05 K/UL — SIGNIFICANT CHANGE UP (ref 1.8–7.4)
NEUTROPHILS NFR BLD AUTO: 70 % — SIGNIFICANT CHANGE UP (ref 43–77)
NITRITE UR-MCNC: NEGATIVE — SIGNIFICANT CHANGE UP
NRBC # BLD: 0 /100 WBCS — SIGNIFICANT CHANGE UP (ref 0–0)
PH UR: 7 — SIGNIFICANT CHANGE UP (ref 5–8)
PLATELET # BLD AUTO: 259 K/UL — SIGNIFICANT CHANGE UP (ref 150–400)
POTASSIUM SERPL-MCNC: 3.8 MMOL/L — SIGNIFICANT CHANGE UP (ref 3.5–5.3)
POTASSIUM SERPL-SCNC: 3.8 MMOL/L — SIGNIFICANT CHANGE UP (ref 3.5–5.3)
PROT SERPL-MCNC: 7.5 G/DL — SIGNIFICANT CHANGE UP (ref 6.4–8.2)
PROT UR-MCNC: ABNORMAL MG/DL
PROTHROM AB SERPL-ACNC: 11 SEC — SIGNIFICANT CHANGE UP (ref 9.5–13)
RBC # BLD: 4.56 M/UL — SIGNIFICANT CHANGE UP (ref 3.8–5.2)
RBC # FLD: 13.5 % — SIGNIFICANT CHANGE UP (ref 10.3–14.5)
RBC CASTS # UR COMP ASSIST: 10 /HPF — HIGH (ref 0–4)
SODIUM SERPL-SCNC: 139 MMOL/L — SIGNIFICANT CHANGE UP (ref 132–145)
SP GR SPEC: 1 — SIGNIFICANT CHANGE UP (ref 1–1.03)
UROBILINOGEN FLD QL: 0.2 MG/DL — SIGNIFICANT CHANGE UP (ref 0.2–1)
WBC # BLD: 10.07 K/UL — SIGNIFICANT CHANGE UP (ref 3.8–10.5)
WBC # FLD AUTO: 10.07 K/UL — SIGNIFICANT CHANGE UP (ref 3.8–10.5)
WBC UR QL: SIGNIFICANT CHANGE UP /HPF (ref 0–5)

## 2023-10-15 PROCEDURE — 99284 EMERGENCY DEPT VISIT MOD MDM: CPT

## 2023-10-15 RX ORDER — SODIUM CHLORIDE 9 MG/ML
500 INJECTION INTRAMUSCULAR; INTRAVENOUS; SUBCUTANEOUS ONCE
Refills: 0 | Status: COMPLETED | OUTPATIENT
Start: 2023-10-15 | End: 2023-10-15

## 2023-10-15 RX ORDER — ACETAMINOPHEN 500 MG
650 TABLET ORAL ONCE
Refills: 0 | Status: COMPLETED | OUTPATIENT
Start: 2023-10-15 | End: 2023-10-15

## 2023-10-15 RX ORDER — CEFPODOXIME PROXETIL 100 MG
1 TABLET ORAL
Qty: 20 | Refills: 0
Start: 2023-10-15 | End: 2023-10-24

## 2023-10-15 RX ORDER — CEFTRIAXONE 500 MG/1
1000 INJECTION, POWDER, FOR SOLUTION INTRAMUSCULAR; INTRAVENOUS ONCE
Refills: 0 | Status: COMPLETED | OUTPATIENT
Start: 2023-10-15 | End: 2023-10-15

## 2023-10-15 RX ORDER — PHENAZOPYRIDINE HCL 100 MG
100 TABLET ORAL ONCE
Refills: 0 | Status: COMPLETED | OUTPATIENT
Start: 2023-10-15 | End: 2023-10-15

## 2023-10-15 RX ADMIN — CEFTRIAXONE 100 MILLIGRAM(S): 500 INJECTION, POWDER, FOR SOLUTION INTRAMUSCULAR; INTRAVENOUS at 18:03

## 2023-10-15 RX ADMIN — SODIUM CHLORIDE 500 MILLILITER(S): 9 INJECTION INTRAMUSCULAR; INTRAVENOUS; SUBCUTANEOUS at 17:28

## 2023-10-15 RX ADMIN — Medication 100 MILLIGRAM(S): at 17:28

## 2023-10-15 RX ADMIN — SODIUM CHLORIDE 500 MILLILITER(S): 9 INJECTION INTRAMUSCULAR; INTRAVENOUS; SUBCUTANEOUS at 16:09

## 2023-10-15 RX ADMIN — Medication 650 MILLIGRAM(S): at 16:08

## 2023-10-15 NOTE — ED ADULT NURSE NOTE - NSFALLUNIVINTERV_ED_ALL_ED
Bed/Stretcher in lowest position, wheels locked, appropriate side rails in place/Call bell, personal items and telephone in reach/Instruct patient to call for assistance before getting out of bed/chair/stretcher/Non-slip footwear applied when patient is off stretcher/[place] to call system/Physically safe environment - no spills, clutter or unnecessary equipment/Purposeful proactive rounding/Room/bathroom lighting operational, light cord in reach

## 2023-10-15 NOTE — ED PROVIDER NOTE - PROGRESS NOTE DETAILS
Patient phoned requesting an appointment for Anxiety X 3 days denies harming self or others. Appointment given 4/4/2018 she agreed. UA consistent with UTI.  No evidence of sepsis.  Initial lactate elevated.  Repeat lactate after 1 L NS within normal limits.  Patient given IV ceftriaxone, will DC with p.o. cefpodoxime.  Patient feels better after p.o. Tylenol and Pyridium.  Has urologist with whom she will follow-up.  Urine culture sent.  Stable for DC.  Return precautions discussed.

## 2023-10-15 NOTE — ED PROVIDER NOTE - PATIENT PORTAL LINK FT
You can access the FollowMyHealth Patient Portal offered by [facility] by registering at the following website: [email or web address]. By joining [employer]’s FollowMyHealth portal, you will also be able to view your health information using other applications (apps) compatible with our system.

## 2023-10-15 NOTE — ED ADULT NURSE NOTE - NS ED PATIENT SAFETY CONCERN
Next appt 5/13/19  Last appt 9/5/18    Refill request for  hydrochlorothiazide (HYDRODIURIL) 25 MG tablet 90 tablet 1 11/19/2018     Sig: TAKE 1 TABLET BY MOUTH EVERY DAY    Sent to pharmacy as: HydroCHLOROthiazide 25 MG Oral Tablet          Refilled per standing med protocol.     No

## 2023-10-15 NOTE — ED PROVIDER NOTE - OBJECTIVE STATEMENT
78 y/o F with PMH of hypertension and frequent UTIs presents to the ED for evaluation. Pt reports she has been having dysuria, urinary frequency, and urgency. She started taking Macrobid for the past week which she had on hand but it did not help with symptoms. Pt believes she has a UTI as her symptoms are consistent with prior UTI presentations. She denies fevers, chills, back pain, Abd pain, or N/V.

## 2023-10-15 NOTE — ED ADULT NURSE NOTE - OBJECTIVE STATEMENT
pt c/o worsening dysuria, frequency and hesitancy 1 week. pt states shes been taking macrobid she had on hand x1 week with no improvement of sx. denies fever/chills, denies flank pain. a+ox4, resp even and unlabored, steady gait.

## 2023-10-15 NOTE — ED ADULT TRIAGE NOTE - CHIEF COMPLAINT QUOTE
worsening dysuria, frequency and hesitancy 1 week self diagnosed UTI x last sunday and have been taking Macrobid that she had on hand for 1 week.

## 2023-10-15 NOTE — ED PROVIDER NOTE - CLINICAL SUMMARY MEDICAL DECISION MAKING FREE TEXT BOX
76 y/o F with Hx of recurrent UTIs presenting with urinary urgency, frequency, and dysuria x1 week. Symptoms have not resolved despite taking 1 week of PO Macrobid. On exam, Pt is afebrile. No vital sign derangements. Abd is soft, non tender, non distended. No CVA tenderness. Suspect for UTI. Differential Dx includes sepsis although less likely. Plan for blood work, medhat IV hydration, analgesics, and UA. Will reassess clinically after results have been obtained.

## 2023-10-19 LAB
-  AMIKACIN: SIGNIFICANT CHANGE UP
-  AMIKACIN: SIGNIFICANT CHANGE UP
-  AMOXICILLIN/CLAVULANIC ACID: SIGNIFICANT CHANGE UP
-  AMOXICILLIN/CLAVULANIC ACID: SIGNIFICANT CHANGE UP
-  AMPICILLIN/SULBACTAM: SIGNIFICANT CHANGE UP
-  AMPICILLIN/SULBACTAM: SIGNIFICANT CHANGE UP
-  AMPICILLIN: SIGNIFICANT CHANGE UP
-  AMPICILLIN: SIGNIFICANT CHANGE UP
-  AZTREONAM: SIGNIFICANT CHANGE UP
-  AZTREONAM: SIGNIFICANT CHANGE UP
-  CEFAZOLIN: SIGNIFICANT CHANGE UP
-  CEFAZOLIN: SIGNIFICANT CHANGE UP
-  CEFEPIME: SIGNIFICANT CHANGE UP
-  CEFEPIME: SIGNIFICANT CHANGE UP
-  CEFOXITIN: SIGNIFICANT CHANGE UP
-  CEFOXITIN: SIGNIFICANT CHANGE UP
-  CEFTRIAXONE: SIGNIFICANT CHANGE UP
-  CEFTRIAXONE: SIGNIFICANT CHANGE UP
-  CIPROFLOXACIN: SIGNIFICANT CHANGE UP
-  CIPROFLOXACIN: SIGNIFICANT CHANGE UP
-  ERTAPENEM: SIGNIFICANT CHANGE UP
-  ERTAPENEM: SIGNIFICANT CHANGE UP
-  GENTAMICIN: SIGNIFICANT CHANGE UP
-  GENTAMICIN: SIGNIFICANT CHANGE UP
-  IMIPENEM: SIGNIFICANT CHANGE UP
-  IMIPENEM: SIGNIFICANT CHANGE UP
-  LEVOFLOXACIN: SIGNIFICANT CHANGE UP
-  LEVOFLOXACIN: SIGNIFICANT CHANGE UP
-  MEROPENEM: SIGNIFICANT CHANGE UP
-  MEROPENEM: SIGNIFICANT CHANGE UP
-  NITROFURANTOIN: SIGNIFICANT CHANGE UP
-  NITROFURANTOIN: SIGNIFICANT CHANGE UP
-  PIPERACILLIN/TAZOBACTAM: SIGNIFICANT CHANGE UP
-  PIPERACILLIN/TAZOBACTAM: SIGNIFICANT CHANGE UP
-  TOBRAMYCIN: SIGNIFICANT CHANGE UP
-  TOBRAMYCIN: SIGNIFICANT CHANGE UP
-  TRIMETHOPRIM/SULFAMETHOXAZOLE: SIGNIFICANT CHANGE UP
-  TRIMETHOPRIM/SULFAMETHOXAZOLE: SIGNIFICANT CHANGE UP
CULTURE RESULTS: SIGNIFICANT CHANGE UP
CULTURE RESULTS: SIGNIFICANT CHANGE UP
METHOD TYPE: SIGNIFICANT CHANGE UP
METHOD TYPE: SIGNIFICANT CHANGE UP
ORGANISM # SPEC MICROSCOPIC CNT: SIGNIFICANT CHANGE UP
SPECIMEN SOURCE: SIGNIFICANT CHANGE UP
SPECIMEN SOURCE: SIGNIFICANT CHANGE UP

## 2023-10-20 LAB
CULTURE RESULTS: SIGNIFICANT CHANGE UP
SPECIMEN SOURCE: SIGNIFICANT CHANGE UP

## 2023-11-25 ENCOUNTER — APPOINTMENT (OUTPATIENT)
Dept: AFTER HOURS CARE | Facility: EMERGENCY ROOM | Age: 78
End: 2023-11-25
Payer: MEDICARE

## 2023-11-25 DIAGNOSIS — R39.9 UNSPECIFIED SYMPTOMS AND SIGNS INVOLVING THE GENITOURINARY SYSTEM: ICD-10-CM

## 2023-11-25 PROCEDURE — 99204 OFFICE O/P NEW MOD 45 MIN: CPT | Mod: 95

## 2023-11-25 RX ORDER — CEPHALEXIN 500 MG/1
500 CAPSULE ORAL 3 TIMES DAILY
Qty: 21 | Refills: 0 | Status: ACTIVE | COMMUNITY
Start: 2023-11-25 | End: 1900-01-01

## 2023-12-12 ENCOUNTER — APPOINTMENT (OUTPATIENT)
Dept: AFTER HOURS CARE | Facility: EMERGENCY ROOM | Age: 78
End: 2023-12-12
Payer: MEDICARE

## 2023-12-12 DIAGNOSIS — N39.0 URINARY TRACT INFECTION, SITE NOT SPECIFIED: ICD-10-CM

## 2023-12-12 PROCEDURE — 99214 OFFICE O/P EST MOD 30 MIN: CPT | Mod: 95

## 2023-12-12 RX ORDER — SULFAMETHOXAZOLE AND TRIMETHOPRIM 800; 160 MG/1; MG/1
800-160 TABLET ORAL TWICE DAILY
Qty: 14 | Refills: 0 | Status: ACTIVE | COMMUNITY
Start: 2023-12-12 | End: 1900-01-01

## 2025-02-12 ENCOUNTER — NON-APPOINTMENT (OUTPATIENT)
Age: 80
End: 2025-02-12

## 2025-02-25 ENCOUNTER — APPOINTMENT (OUTPATIENT)
Dept: OTOLARYNGOLOGY | Facility: CLINIC | Age: 80
End: 2025-02-25

## 2025-02-25 VITALS
WEIGHT: 138 LBS | HEART RATE: 109 BPM | SYSTOLIC BLOOD PRESSURE: 105 MMHG | DIASTOLIC BLOOD PRESSURE: 69 MMHG | BODY MASS INDEX: 25.4 KG/M2 | OXYGEN SATURATION: 96 % | TEMPERATURE: 96.3 F | HEIGHT: 62 IN

## 2025-02-25 DIAGNOSIS — H61.23 IMPACTED CERUMEN, BILATERAL: ICD-10-CM

## 2025-02-25 DIAGNOSIS — H91.93 UNSPECIFIED HEARING LOSS, BILATERAL: ICD-10-CM

## 2025-02-25 DIAGNOSIS — L98.499 NON-PRESSURE CHRONIC ULCER OF SKIN OF OTHER SITES WITH UNSPECIFIED SEVERITY: ICD-10-CM

## 2025-02-25 PROCEDURE — 69210 REMOVE IMPACTED EAR WAX UNI: CPT

## 2025-02-25 PROCEDURE — 99204 OFFICE O/P NEW MOD 45 MIN: CPT | Mod: 25

## 2025-02-25 RX ORDER — MUPIROCIN 20 MG/G
2 OINTMENT TOPICAL
Qty: 1 | Refills: 1 | Status: ACTIVE | COMMUNITY
Start: 2025-02-25 | End: 1900-01-01

## 2025-03-18 ENCOUNTER — APPOINTMENT (OUTPATIENT)
Dept: OTOLARYNGOLOGY | Facility: CLINIC | Age: 80
End: 2025-03-18
Payer: MEDICARE

## 2025-03-18 VITALS
WEIGHT: 138 LBS | HEIGHT: 62 IN | HEART RATE: 92 BPM | OXYGEN SATURATION: 96 % | BODY MASS INDEX: 25.4 KG/M2 | TEMPERATURE: 98.2 F | SYSTOLIC BLOOD PRESSURE: 135 MMHG | DIASTOLIC BLOOD PRESSURE: 74 MMHG

## 2025-03-18 DIAGNOSIS — H90.3 SENSORINEURAL HEARING LOSS, BILATERAL: ICD-10-CM

## 2025-03-18 PROCEDURE — 99213 OFFICE O/P EST LOW 20 MIN: CPT

## 2025-03-18 PROCEDURE — G2211 COMPLEX E/M VISIT ADD ON: CPT

## 2025-03-18 PROCEDURE — 92557 COMPREHENSIVE HEARING TEST: CPT

## 2025-03-18 PROCEDURE — 92550 TYMPANOMETRY & REFLEX THRESH: CPT | Mod: 52
